# Patient Record
Sex: FEMALE | Race: OTHER | Employment: UNEMPLOYED | URBAN - NONMETROPOLITAN AREA
[De-identification: names, ages, dates, MRNs, and addresses within clinical notes are randomized per-mention and may not be internally consistent; named-entity substitution may affect disease eponyms.]

---
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
## 2024-01-02 LAB
C TRACH DNA SPEC QL PROBE+SIG AMP: NEGATIVE
N GONORRHOEA DNA SPEC QL PROBE+SIG AMP: NEGATIVE
SPECIMEN DESCRIPTION: NORMAL

## 2024-01-11 ENCOUNTER — TELEPHONE (OUTPATIENT)
Dept: OBGYN CLINIC | Age: 19
End: 2024-01-11

## 2024-01-16 ENCOUNTER — INITIAL PRENATAL (OUTPATIENT)
Dept: OBGYN CLINIC | Age: 19
End: 2024-01-16
Payer: COMMERCIAL

## 2024-01-16 VITALS — WEIGHT: 194 LBS | SYSTOLIC BLOOD PRESSURE: 118 MMHG | BODY MASS INDEX: 30.38 KG/M2 | DIASTOLIC BLOOD PRESSURE: 76 MMHG

## 2024-01-16 DIAGNOSIS — N91.2 AMENORRHEA: Primary | ICD-10-CM

## 2024-01-16 DIAGNOSIS — Z34.90 ENCOUNTER FOR SUPERVISION OF NORMAL PREGNANCY, ANTEPARTUM, UNSPECIFIED GRAVIDITY: ICD-10-CM

## 2024-01-16 PROCEDURE — H1000 PRENATAL CARE ATRISK ASSESSM: HCPCS

## 2024-01-16 RX ORDER — PNV NO.95/FERROUS FUM/FOLIC AC 28MG-0.8MG
1 TABLET ORAL DAILY
Qty: 90 TABLET | Refills: 3 | Status: CANCELLED | OUTPATIENT
Start: 2024-01-16

## 2024-01-16 NOTE — PATIENT INSTRUCTIONS
Patient Education        Nutrition for Breastfeeding: Care Instructions  Overview     Eating well during breastfeeding helps you stay healthy. Eat a variety of grains, vegetables, fruits, dairy or dairy alternatives, and protein foods. Avoid fish high in mercury. And limit alcohol and caffeine. Your doctor may suggest eating more calories each day than otherwise recommended for a person of your height and weight.  Follow-up care is a key part of your treatment and safety. Be sure to make and go to all appointments, and call your doctor if you are having problems. It's also a good idea to know your test results and keep a list of the medicines you take.  How can you care for yourself at home?  Making good choices about what you eat and drink when you are breastfeeding can help you stay healthy. It can also help your baby stay healthy. Here are some things you can do.  Eat a variety of healthy foods.  This includes vegetables, fruits, milk products, whole grains, and protein.  Drink plenty of fluids.  Make water your first choice. If you have kidney, heart, or liver disease and have to limit fluids, talk with your doctor before you increase your fluids.  Avoid fish with high mercury.  This includes shark, swordfish, lizzeth mackerel, and marlin. It also includes orange roughy, bigeye tuna, and tilefish from the [place]. Instead, eat fish that is low in mercury. Choose canned light tuna, salmon, pollock, catfish, or shrimp.   Limit caffeine.  Things like coffee, tea, chocolate, and some sodas can contain caffeine. Caffeine can pass through breast milk to your baby. It may cause fussiness and sleep problems. Talk to your doctor about how much caffeine is safe for you.  Limit alcohol.  Alcohol can pass through breast milk to your baby. Talk to your doctor if you have questions about drinking alcohol while breastfeeding.  Be safe with supplements.  Talk with your doctor before taking any vitamins, minerals, and herbal

## 2024-01-16 NOTE — PROGRESS NOTES
Avoid things that can make your body too hot.  For example, avoid hot tubs and saunas.     Soothe morning sickness.  Try eating 5 or 6 small meals a day, getting some fresh air, or using arun to control symptoms.     Ask your doctor about flu and COVID-19 shots.  Getting them can help protect against infection.   Follow-up care is a key part of your treatment and safety. Be sure to make and go to all appointments, and call your doctor if you are having problems. It's also a good idea to know your test results and keep a list of the medicines you take.  Where can you learn more?  Go to https://www.[employer].net/patientEd and enter G112 to learn more about \"Weeks 6 to 10 of Your Pregnancy: Care Instructions.\"  Current as of: July 11, 2023               Content Version: 13.9  © 0770-0804 [employer].   Care instructions adapted under license by "[employer]. If you have questions about a medical condition or this instruction, always ask your healthcare professional. APR disclaims any warranty or liability for your use of this information.         SURVEY:    You may be receiving a survey from [employer] regarding your visit today.    Please complete the survey to enable us to provide the highest quality of care to you and your family.    If you cannot score us a very good on any question, please call the office to discuss how we could have made your experience a very good one.    Thank you.  [facility]  MD Jossy Guerrero, MD Ced Mata, MD Kingsley Novak, DO  Richie Diaz, APRN-CNM  Evelyne Villareal, APRN-CNP  Tanesha Julian, PM  Jory, CMA  [place], CCMA  Angel, CMA  Adama, CMA  Bertin, [facility]   [place], MA

## 2024-01-26 ENCOUNTER — HOSPITAL ENCOUNTER (OUTPATIENT)
Age: 19
Setting detail: SPECIMEN
Discharge: HOME | End: 2024-01-26
Payer: COMMERCIAL

## 2024-01-26 ENCOUNTER — ROUTINE PRENATAL (OUTPATIENT)
Dept: OBGYN CLINIC | Age: 19
End: 2024-01-26
Payer: COMMERCIAL

## 2024-01-26 VITALS — DIASTOLIC BLOOD PRESSURE: 72 MMHG | SYSTOLIC BLOOD PRESSURE: 118 MMHG | WEIGHT: 194 LBS | BODY MASS INDEX: 30.38 KG/M2

## 2024-01-26 DIAGNOSIS — Z34.91 NORMAL PREGNANCY IN FIRST TRIMESTER: Primary | ICD-10-CM

## 2024-01-26 DIAGNOSIS — Z34.90 ENCOUNTER FOR SUPERVISION OF NORMAL PREGNANCY, ANTEPARTUM, UNSPECIFIED GRAVIDITY: ICD-10-CM

## 2024-01-26 DIAGNOSIS — Z3A.01 7 WEEKS GESTATION OF PREGNANCY: ICD-10-CM

## 2024-01-26 LAB
ABO + RH BLD: NORMAL
AMPHET UR QL SCN: NEGATIVE
BARBITURATES UR QL SCN: NEGATIVE
BENZODIAZ UR QL: NEGATIVE
BLOOD GROUP ANTIBODIES SERPL: NEGATIVE
CANNABINOIDS UR QL SCN: NEGATIVE
COCAINE UR QL SCN: NEGATIVE
FENTANYL UR QL: NEGATIVE
METHADONE UR QL: NEGATIVE
OPIATES UR QL SCN: NEGATIVE
OXYCODONE UR QL SCN: NEGATIVE
PCP UR QL SCN: NEGATIVE
TEST INFORMATION: NORMAL

## 2024-01-26 PROCEDURE — 86900 BLOOD TYPING SEROLOGIC ABO: CPT

## 2024-01-26 PROCEDURE — 87591 N.GONORRHOEAE DNA AMP PROB: CPT

## 2024-01-26 PROCEDURE — G8417 CALC BMI ABV UP PARAM F/U: HCPCS

## 2024-01-26 PROCEDURE — 87491 CHLMYD TRACH DNA AMP PROBE: CPT

## 2024-01-26 PROCEDURE — 99213 OFFICE O/P EST LOW 20 MIN: CPT

## 2024-01-26 PROCEDURE — 87340 HEPATITIS B SURFACE AG IA: CPT

## 2024-01-26 PROCEDURE — 80306 DRUG TEST PRSMV INSTRMNT: CPT

## 2024-01-26 PROCEDURE — 86850 RBC ANTIBODY SCREEN: CPT

## 2024-01-26 PROCEDURE — 86780 TREPONEMA PALLIDUM: CPT

## 2024-01-26 PROCEDURE — G8484 FLU IMMUNIZE NO ADMIN: HCPCS

## 2024-01-26 PROCEDURE — G8427 DOCREV CUR MEDS BY ELIG CLIN: HCPCS

## 2024-01-26 PROCEDURE — 85025 COMPLETE CBC W/AUTO DIFF WBC: CPT

## 2024-01-26 PROCEDURE — 87086 URINE CULTURE/COLONY COUNT: CPT

## 2024-01-26 PROCEDURE — 1036F TOBACCO NON-USER: CPT

## 2024-01-26 PROCEDURE — 86803 HEPATITIS C AB TEST: CPT

## 2024-01-26 PROCEDURE — 86901 BLOOD TYPING SEROLOGIC RH(D): CPT

## 2024-01-26 PROCEDURE — 87077 CULTURE AEROBIC IDENTIFY: CPT

## 2024-01-26 PROCEDURE — 87389 HIV-1 AG W/HIV-1&-2 AB AG IA: CPT

## 2024-01-26 PROCEDURE — 86762 RUBELLA ANTIBODY: CPT

## 2024-01-26 PROCEDURE — 87186 SC STD MICRODIL/AGAR DIL: CPT

## 2024-01-26 RX ORDER — PNV NO.95/FERROUS FUM/FOLIC AC 28MG-0.8MG
1 TABLET ORAL DAILY
Qty: 90 TABLET | Refills: 3 | Status: SHIPPED | OUTPATIENT
Start: 2024-01-26

## 2024-01-27 LAB
BASOPHILS # BLD: 0.03 K/UL (ref 0–0.2)
BASOPHILS NFR BLD: 0 % (ref 0–2)
EOSINOPHIL # BLD: 0.1 K/UL (ref 0–0.4)
EOSINOPHILS RELATIVE PERCENT: 1 % (ref 0–5)
ERYTHROCYTE [DISTWIDTH] IN BLOOD BY AUTOMATED COUNT: 13.6 % (ref 12.1–15.2)
HBV SURFACE AG SERPL QL IA: NONREACTIVE
HCT VFR BLD AUTO: 39.7 % (ref 36–46)
HCV AB SERPL QL IA: NONREACTIVE
HGB BLD-MCNC: 13 G/DL (ref 12–16)
HIV 1+2 AB+HIV1 P24 AG SERPL QL IA: NONREACTIVE
IMM GRANULOCYTES # BLD AUTO: 0.02 K/UL (ref 0–0.3)
IMM GRANULOCYTES NFR BLD: 0 % (ref 0–5)
LYMPHOCYTES NFR BLD: 2.05 K/UL (ref 1.2–5.2)
LYMPHOCYTES RELATIVE PERCENT: 27 % (ref 15–40)
MCH RBC QN AUTO: 27.2 PG (ref 26–34)
MCHC RBC AUTO-ENTMCNC: 32.7 G/DL (ref 31–37)
MCV RBC AUTO: 83.1 FL (ref 80–100)
MONOCYTES NFR BLD: 0.46 K/UL (ref 0–1)
MONOCYTES NFR BLD: 6 % (ref 4–8)
NEUTROPHILS NFR BLD: 66 % (ref 47–75)
NEUTS SEG NFR BLD: 5.08 K/UL (ref 2.5–7)
PLATELET # BLD AUTO: 320 K/UL (ref 140–450)
PMV BLD AUTO: 10.2 FL (ref 6–12)
RBC # BLD AUTO: 4.78 M/UL (ref 4–5.2)
RUBV IGG SERPL QL IA: 90.53 IU/ML
T PALLIDUM AB SER QL IA: NONREACTIVE
WBC OTHER # BLD: 7.7 K/UL (ref 4.5–13.5)

## 2024-01-28 LAB
MICROORGANISM SPEC CULT: ABNORMAL
SPECIMEN DESCRIPTION: ABNORMAL

## 2024-01-29 LAB
CHLAMYDIA DNA UR QL NAA+PROBE: NEGATIVE
N GONORRHOEA DNA UR QL NAA+PROBE: NEGATIVE
SPECIMEN DESCRIPTION: NORMAL

## 2024-01-29 RX ORDER — NITROFURANTOIN 25; 75 MG/1; MG/1
100 CAPSULE ORAL 2 TIMES DAILY
Qty: 10 CAPSULE | Refills: 0 | Status: SHIPPED | OUTPATIENT
Start: 2024-01-29 | End: 2024-02-03

## 2024-02-16 ENCOUNTER — ROUTINE PRENATAL (OUTPATIENT)
Dept: OBGYN CLINIC | Age: 19
End: 2024-02-16

## 2024-02-16 VITALS
SYSTOLIC BLOOD PRESSURE: 118 MMHG | DIASTOLIC BLOOD PRESSURE: 76 MMHG | HEART RATE: 68 BPM | WEIGHT: 199 LBS | BODY MASS INDEX: 31.17 KG/M2

## 2024-02-16 DIAGNOSIS — Z34.91 NORMAL PREGNANCY IN FIRST TRIMESTER: Primary | ICD-10-CM

## 2024-02-16 DIAGNOSIS — Z3A.10 10 WEEKS GESTATION OF PREGNANCY: ICD-10-CM

## 2024-02-16 DIAGNOSIS — O09.291 HISTORY OF PREGNANCY LOSS IN PRIOR PREGNANCY, CURRENTLY PREGNANT IN FIRST TRIMESTER: ICD-10-CM

## 2024-02-16 NOTE — PROGRESS NOTES
Bibiana Dangelo is here at 10w5d for:    Chief Complaint   Patient presents with    Routine Prenatal Visit     Patient presents today for routine ob care with in house pan/horizon draw.        Estimated Due Date: Estimated Date of Delivery: 24    OB History    Para Term  AB Living   2 1 0 0 0 0   SAB IAB Ectopic Molar Multiple Live Births   0 0 0 0 0 0      # Outcome Date GA Lbr Holger/2nd Weight Sex Delivery Anes PTL Lv   2 Current            1 Para 23 19w5d  0.12 kg (4.2 oz)  Vag-Spont None N FD      Complications: Fetal demise before 22 weeks with retention of dead fetus        Past Medical History:   Diagnosis Date    Acute depression 2020    ADHD (attention deficit hyperactivity disorder)     Headache      delivery     Strep throat     Trauma        History reviewed. No pertinent surgical history.    Social History     Tobacco Use   Smoking Status Never   Smokeless Tobacco Never        Social History     Substance and Sexual Activity   Alcohol Use Never       No results found for this visit on 24.        HPI: Patient presents for a routine OB visit and declines concerns. She reports fetal movement is absent. She declines contractions, vaginal bleeding, and leaking of fluids.        PT denies fever, chills, nausea and vomiting       Vitals:  Estimated body mass index is 31.17 kg/m² as calculated from the following:    Height as of 23: 1.702 m (5' 7\").    Weight as of this encounter: 90.3 kg (199 lb).  BP: 118/76  Weight - Scale: 90.3 kg (199 lb)  Pulse: 68  Patient Position: Sitting  Fetal HR: 162  Movement: Absent           Abdomen: soft, nontender    Results reviewed today:    No results found for this visit on 24.        ASSESSMENT & Plan    Diagnosis Orders   1. Normal pregnancy in first trimester  Panorama Prenatal Test    Horizon 3 (SMA, CF, Fragile X)      2. 10 weeks gestation of pregnancy  Panorama Prenatal Test    Horizon 3 (SMA, CF, Fragile X)

## 2024-02-22 LAB
Lab: NORMAL
NTRA FETAL FRACTION: NORMAL
NTRA GENDER OF FETUS: NORMAL
NTRA MONOSOMY X AGE-BASED RISK TEXT: NORMAL
NTRA MONOSOMY X RESULT TEXT: NORMAL
NTRA MONOSOMY X RISK SCORE TEXT: NORMAL
NTRA TRIPLOIDY RESULT TEXT: NORMAL
NTRA TRISOMY 13 AGE-BASED RISK TEXT: NORMAL
NTRA TRISOMY 13 RESULT TEXT: NORMAL
NTRA TRISOMY 13 RISK SCORE TEXT: NORMAL
NTRA TRISOMY 18 AGE-BASED RISK TEXT: NORMAL
NTRA TRISOMY 18 RESULT TEXT: NORMAL
NTRA TRISOMY 18 RISK SCORE TEXT: NORMAL
NTRA TRISOMY 21 AGE-BASED RISK TEXT: NORMAL
NTRA TRISOMY 21 RESULT TEXT: NORMAL
NTRA TRISOMY 21 RISK SCORE TEXT: NORMAL

## 2024-02-25 LAB
Lab: NEGATIVE
Lab: NORMAL
NTRA CYSTIC FIBROSIS: NEGATIVE
NTRA FRAGILE X SYNDROME: NEGATIVE
NTRA SPINAL MUSCULAR ATROPHY: NEGATIVE

## 2024-03-08 ENCOUNTER — HOSPITAL ENCOUNTER (OUTPATIENT)
Age: 19
Setting detail: SPECIMEN
Discharge: HOME | End: 2024-03-08
Payer: COMMERCIAL

## 2024-03-08 ENCOUNTER — ROUTINE PRENATAL (OUTPATIENT)
Dept: OBGYN CLINIC | Age: 19
End: 2024-03-08

## 2024-03-08 VITALS
SYSTOLIC BLOOD PRESSURE: 116 MMHG | DIASTOLIC BLOOD PRESSURE: 72 MMHG | WEIGHT: 190 LBS | BODY MASS INDEX: 29.76 KG/M2 | HEART RATE: 101 BPM

## 2024-03-08 DIAGNOSIS — N89.8 VAGINAL DISCHARGE: ICD-10-CM

## 2024-03-08 DIAGNOSIS — R30.0 BURNING WITH URINATION: ICD-10-CM

## 2024-03-08 DIAGNOSIS — Z34.92 NORMAL PREGNANCY IN SECOND TRIMESTER: Primary | ICD-10-CM

## 2024-03-08 DIAGNOSIS — Z3A.13 13 WEEKS GESTATION OF PREGNANCY: ICD-10-CM

## 2024-03-08 DIAGNOSIS — O09.292 HISTORY OF PREGNANCY LOSS IN PRIOR PREGNANCY, CURRENTLY PREGNANT IN SECOND TRIMESTER: ICD-10-CM

## 2024-03-08 LAB
CANDIDA SPECIES: NEGATIVE
SOURCE: NORMAL
TRICHOMONAS: NEGATIVE

## 2024-03-08 PROCEDURE — 87086 URINE CULTURE/COLONY COUNT: CPT

## 2024-03-08 PROCEDURE — 87510 GARDNER VAG DNA DIR PROBE: CPT

## 2024-03-08 PROCEDURE — 87480 CANDIDA DNA DIR PROBE: CPT

## 2024-03-08 PROCEDURE — 87660 TRICHOMONAS VAGIN DIR PROBE: CPT

## 2024-03-08 ASSESSMENT — PATIENT HEALTH QUESTIONNAIRE - PHQ9
6. FEELING BAD ABOUT YOURSELF - OR THAT YOU ARE A FAILURE OR HAVE LET YOURSELF OR YOUR FAMILY DOWN: 0
10. IF YOU CHECKED OFF ANY PROBLEMS, HOW DIFFICULT HAVE THESE PROBLEMS MADE IT FOR YOU TO DO YOUR WORK, TAKE CARE OF THINGS AT HOME, OR GET ALONG WITH OTHER PEOPLE: 0
1. LITTLE INTEREST OR PLEASURE IN DOING THINGS: 0
8. MOVING OR SPEAKING SO SLOWLY THAT OTHER PEOPLE COULD HAVE NOTICED. OR THE OPPOSITE, BEING SO FIGETY OR RESTLESS THAT YOU HAVE BEEN MOVING AROUND A LOT MORE THAN USUAL: 0
SUM OF ALL RESPONSES TO PHQ QUESTIONS 1-9: 2
4. FEELING TIRED OR HAVING LITTLE ENERGY: 2
9. THOUGHTS THAT YOU WOULD BE BETTER OFF DEAD, OR OF HURTING YOURSELF: 0
7. TROUBLE CONCENTRATING ON THINGS, SUCH AS READING THE NEWSPAPER OR WATCHING TELEVISION: 0
5. POOR APPETITE OR OVEREATING: 0
3. TROUBLE FALLING OR STAYING ASLEEP: 0
SUM OF ALL RESPONSES TO PHQ9 QUESTIONS 1 & 2: 0
2. FEELING DOWN, DEPRESSED OR HOPELESS: 0

## 2024-03-08 NOTE — PROGRESS NOTES
Bibiana Dangelo is here at 13w5d for:    Chief Complaint   Patient presents with    Routine Prenatal Visit     Patient presents today for routine ob care. Unable to leave urine sample but having burning with urination.        Estimated Due Date: Estimated Date of Delivery: 24    OB History    Para Term  AB Living   2 1 0 0 0 0   SAB IAB Ectopic Molar Multiple Live Births   0 0 0 0 0 0      # Outcome Date GA Lbr Holger/2nd Weight Sex Delivery Anes PTL Lv   2 Current            1 Para 23 19w5d  0.12 kg (4.2 oz)  Vag-Spont None N FD      Complications: Fetal demise before 22 weeks with retention of dead fetus        Past Medical History:   Diagnosis Date    Acute depression 2020    ADHD (attention deficit hyperactivity disorder)     Headache      delivery     Strep throat     Trauma        History reviewed. No pertinent surgical history.    Social History     Tobacco Use   Smoking Status Never   Smokeless Tobacco Never        Social History     Substance and Sexual Activity   Alcohol Use Never       No results found for this visit on 24.        HPI: Patient presents for a routine OB visit and reports concerns for thick, white vaginal discharge with an odor and burning with urination. She reports fetal movement is absent. She declines contractions, vaginal bleeding, and leaking of fluids. She also reports some back pain and thinks this may be due to work because she walks a lot.      PT denies fever, chills, nausea and vomiting       Vitals:  Estimated body mass index is 29.76 kg/m² as calculated from the following:    Height as of 23: 1.702 m (5' 7\").    Weight as of this encounter: 86.2 kg (190 lb).  BP: 116/72  Weight - Scale: 86.2 kg (190 lb)  Pulse: (!) 101  Patient Position: Sitting  Fetal HR: 154  Movement: Absent           Abdomen: soft, nontender    Results reviewed today:    No results found for this visit on 24.        ASSESSMENT & Plan    Diagnosis

## 2024-03-09 LAB
MICROORGANISM SPEC CULT: NORMAL
SPECIMEN DESCRIPTION: NORMAL

## 2024-03-21 ENCOUNTER — HOSPITAL ENCOUNTER (EMERGENCY)
Age: 19
Discharge: HOME | End: 2024-03-21
Attending: EMERGENCY MEDICINE
Payer: COMMERCIAL

## 2024-03-21 VITALS
HEART RATE: 89 BPM | SYSTOLIC BLOOD PRESSURE: 125 MMHG | TEMPERATURE: 98.6 F | WEIGHT: 190 LBS | BODY MASS INDEX: 28.79 KG/M2 | DIASTOLIC BLOOD PRESSURE: 76 MMHG | HEIGHT: 68 IN | OXYGEN SATURATION: 98 % | RESPIRATION RATE: 18 BRPM

## 2024-03-21 DIAGNOSIS — O26.892 ABDOMINAL PAIN DURING PREGNANCY IN SECOND TRIMESTER: Primary | ICD-10-CM

## 2024-03-21 DIAGNOSIS — R10.9 ABDOMINAL PAIN DURING PREGNANCY IN SECOND TRIMESTER: Primary | ICD-10-CM

## 2024-03-21 LAB
BILIRUB UR QL STRIP: NEGATIVE
CLARITY UR: CLEAR
COLOR UR: YELLOW
COMMENT: ABNORMAL
GLUCOSE UR STRIP-MCNC: NEGATIVE MG/DL
HGB UR QL STRIP.AUTO: NEGATIVE
KETONES UR STRIP-MCNC: NEGATIVE MG/DL
LEUKOCYTE ESTERASE UR QL STRIP: NEGATIVE
NITRITE UR QL STRIP: NEGATIVE
PH UR STRIP: 5 [PH] (ref 5–8)
PROT UR STRIP-MCNC: ABNORMAL MG/DL
SP GR UR STRIP: 1.03 (ref 1–1.03)
UROBILINOGEN UR STRIP-ACNC: NORMAL EU/DL (ref 0–1)

## 2024-03-21 PROCEDURE — 81003 URINALYSIS AUTO W/O SCOPE: CPT

## 2024-03-21 PROCEDURE — 99283 EMERGENCY DEPT VISIT LOW MDM: CPT

## 2024-03-21 ASSESSMENT — PAIN - FUNCTIONAL ASSESSMENT
PAIN_FUNCTIONAL_ASSESSMENT: ACTIVITIES ARE NOT PREVENTED
PAIN_FUNCTIONAL_ASSESSMENT: 0-10

## 2024-03-21 ASSESSMENT — PAIN DESCRIPTION - LOCATION: LOCATION: ABDOMEN

## 2024-03-21 ASSESSMENT — PAIN SCALES - GENERAL: PAINLEVEL_OUTOF10: 7

## 2024-03-21 NOTE — ED PROVIDER NOTES
EMERGENCY DEPARTMENT ENCOUNTER      CHIEF COMPLAINT    Chief Complaint   Patient presents with    Abdominal Pain     C/o lower abdominal pain that started yesterday and is 15 weeks pregnant. Denies any vaginal bleeding.       SOFIE Dangelo is a 18 y.o. female who is approximately 15 weeks pregnant who presents to the emergency room for evaluation of lower abdominal pain.  She denies any vaginal bleeding or vaginal discharge or vomiting or fevers or chills or urinary symptoms.  She does report nausea.  History is through  as patient is French-speaking.      PAST MEDICAL HISTORY    Past Medical History:   Diagnosis Date    Acute depression 2020    ADHD (attention deficit hyperactivity disorder)     Headache      delivery     Strep throat     Trauma        SURGICAL HISTORY    History reviewed. No pertinent surgical history.    CURRENT MEDICATIONS    Current Outpatient Rx   Medication Sig Dispense Refill    Prenatal Vit-Fe Fumarate-FA (PRENATAL VITAMINS) 28-0.8 MG TABS Take 1 tablet by mouth daily 90 tablet 3       ALLERGIES    No Known Allergies    FAMILY HISTORY    Family History   Problem Relation Age of Onset    Mental Illness Maternal Grandmother     Arthritis Maternal Grandmother     Diabetes Maternal Grandmother     Prostate Cancer Maternal Grandfather     Breast Cancer Neg Hx     Ovarian Cancer Neg Hx     Stroke Neg Hx        SOCIAL HISTORY    Social History     Socioeconomic History    Marital status: Single     Spouse name: None    Number of children: None    Years of education: None    Highest education level: None   Tobacco Use    Smoking status: Never    Smokeless tobacco: Never   Vaping Use    Vaping Use: Never used   Substance and Sexual Activity    Alcohol use: Never    Drug use: Never    Sexual activity: Yes     Partners: Male     Comment: none     Social Determinants of Health     Financial Resource Strain: Low Risk  (2023)    Overall Financial Resource

## 2024-03-21 NOTE — DISCHARGE INSTRUCTIONS
Tylenol según sea necesario para el dolor. Regrese a la agnes de emergencias si los síntomas empeoran, incluido el empeoramiento del dolor abdominal, o si desarrolla vómitos intratables, fiebre o escalofríos. Llame a palmer obstetra para seguimiento.

## 2024-03-28 ENCOUNTER — TELEPHONE (OUTPATIENT)
Dept: OBGYN | Age: 19
End: 2024-03-28

## 2024-03-29 ENCOUNTER — ROUTINE PRENATAL (OUTPATIENT)
Dept: OBGYN CLINIC | Age: 19
End: 2024-03-29
Payer: COMMERCIAL

## 2024-03-29 ENCOUNTER — HOSPITAL ENCOUNTER (OUTPATIENT)
Age: 19
Setting detail: SPECIMEN
Discharge: HOME | End: 2024-03-29
Payer: COMMERCIAL

## 2024-03-29 VITALS
DIASTOLIC BLOOD PRESSURE: 72 MMHG | HEART RATE: 77 BPM | SYSTOLIC BLOOD PRESSURE: 118 MMHG | BODY MASS INDEX: 29.1 KG/M2 | WEIGHT: 192 LBS

## 2024-03-29 DIAGNOSIS — Z3A.16 16 WEEKS GESTATION OF PREGNANCY: ICD-10-CM

## 2024-03-29 DIAGNOSIS — Z34.92 NORMAL PREGNANCY IN SECOND TRIMESTER: Primary | ICD-10-CM

## 2024-03-29 DIAGNOSIS — O09.292 HISTORY OF PREGNANCY LOSS IN PRIOR PREGNANCY, CURRENTLY PREGNANT IN SECOND TRIMESTER: ICD-10-CM

## 2024-03-29 PROCEDURE — 36415 COLL VENOUS BLD VENIPUNCTURE: CPT

## 2024-03-29 PROCEDURE — G8427 DOCREV CUR MEDS BY ELIG CLIN: HCPCS

## 2024-03-29 PROCEDURE — 82105 ALPHA-FETOPROTEIN SERUM: CPT

## 2024-03-29 PROCEDURE — 99213 OFFICE O/P EST LOW 20 MIN: CPT

## 2024-03-29 PROCEDURE — G8484 FLU IMMUNIZE NO ADMIN: HCPCS

## 2024-03-29 PROCEDURE — G8417 CALC BMI ABV UP PARAM F/U: HCPCS

## 2024-03-29 PROCEDURE — 1036F TOBACCO NON-USER: CPT

## 2024-03-29 NOTE — PROGRESS NOTES
Bibiana Dangelo is here at 16w5d for:    Chief Complaint   Patient presents with    Routine Prenatal Visit     Patient presents today for routine ob care. Unable to leave urine sample at this time. No concerns.        Estimated Due Date: Estimated Date of Delivery: 24    OB History    Para Term  AB Living   2 1 0 0 0 0   SAB IAB Ectopic Molar Multiple Live Births   0 0 0 0 0 0      # Outcome Date GA Lbr Holger/2nd Weight Sex Delivery Anes PTL Lv   2 Current            1 Para 23 19w5d  0.12 kg (4.2 oz)  Vag-Spont None N FD      Complications: Fetal demise before 22 weeks with retention of dead fetus        Past Medical History:   Diagnosis Date    Acute depression 2020    ADHD (attention deficit hyperactivity disorder)     Headache      delivery     Strep throat     Trauma        History reviewed. No pertinent surgical history.    Social History     Tobacco Use   Smoking Status Never   Smokeless Tobacco Never        Social History     Substance and Sexual Activity   Alcohol Use Never       No results found for this visit on 24.        HPI: Patient presents for a routine OB visit and declines concerns. She reports fetal movement is absent. She declines contractions, vaginal bleeding, and leaking of fluids.        PT denies fever, chills, nausea and vomiting       Vitals:  Estimated body mass index is 29.1 kg/m² as calculated from the following:    Height as of 3/21/24: 1.73 m (5' 8.11\").    Weight as of this encounter: 87.1 kg (192 lb).  BP: 118/72  Weight - Scale: 87.1 kg (192 lb)  Pulse: 77  Patient Position: Sitting  Fetal HR: 150  Movement: Absent  Presentation: Unknown           Abdomen: soft, nontender    Results reviewed today:    No results found for this visit on 24.        ASSESSMENT & Plan    Diagnosis Orders   1. Normal pregnancy in second trimester        2. 16 weeks gestation of pregnancy  Alpha Fetoprotein, Maternal      3. History of pregnancy loss

## 2024-04-03 ENCOUNTER — HOSPITAL ENCOUNTER (OUTPATIENT)
Age: 19
Discharge: HOME | End: 2024-04-03
Payer: COMMERCIAL

## 2024-04-03 ENCOUNTER — ROUTINE PRENATAL (OUTPATIENT)
Dept: PERINATAL CARE | Age: 19
End: 2024-04-03
Payer: COMMERCIAL

## 2024-04-03 VITALS
HEIGHT: 68 IN | TEMPERATURE: 99.9 F | HEART RATE: 79 BPM | DIASTOLIC BLOOD PRESSURE: 71 MMHG | BODY MASS INDEX: 29.4 KG/M2 | RESPIRATION RATE: 16 BRPM | SYSTOLIC BLOOD PRESSURE: 107 MMHG | WEIGHT: 194 LBS

## 2024-04-03 DIAGNOSIS — O09.299 PREVIOUS STILLBIRTH OR DEMISE, ANTEPARTUM: Primary | ICD-10-CM

## 2024-04-03 DIAGNOSIS — Z3A.17 17 WEEKS GESTATION OF PREGNANCY: ICD-10-CM

## 2024-04-03 LAB — HCYS SERPL-SCNC: 5.7 UMOL/L (ref 0–15)

## 2024-04-03 PROCEDURE — 85303 CLOT INHIBIT PROT C ACTIVITY: CPT

## 2024-04-03 PROCEDURE — 81291 MTHFR GENE: CPT

## 2024-04-03 PROCEDURE — 85610 PROTHROMBIN TIME: CPT

## 2024-04-03 PROCEDURE — 85305 CLOT INHIBIT PROT S TOTAL: CPT

## 2024-04-03 PROCEDURE — 85302 CLOT INHIBIT PROT C ANTIGEN: CPT

## 2024-04-03 PROCEDURE — 86147 CARDIOLIPIN ANTIBODY EA IG: CPT

## 2024-04-03 PROCEDURE — G8417 CALC BMI ABV UP PARAM F/U: HCPCS | Performed by: OBSTETRICS & GYNECOLOGY

## 2024-04-03 PROCEDURE — 85613 RUSSELL VIPER VENOM DILUTED: CPT

## 2024-04-03 PROCEDURE — 81240 F2 GENE: CPT

## 2024-04-03 PROCEDURE — 36415 COLL VENOUS BLD VENIPUNCTURE: CPT

## 2024-04-03 PROCEDURE — 83090 ASSAY OF HOMOCYSTEINE: CPT

## 2024-04-03 PROCEDURE — 82105 ALPHA-FETOPROTEIN SERUM: CPT

## 2024-04-03 PROCEDURE — 99244 OFF/OP CNSLTJ NEW/EST MOD 40: CPT | Performed by: OBSTETRICS & GYNECOLOGY

## 2024-04-03 PROCEDURE — 81241 F5 GENE: CPT

## 2024-04-03 PROCEDURE — 85300 ANTITHROMBIN III ACTIVITY: CPT

## 2024-04-03 PROCEDURE — 85301 ANTITHROMBIN III ANTIGEN: CPT

## 2024-04-03 PROCEDURE — 85306 CLOT INHIBIT PROT S FREE: CPT

## 2024-04-03 PROCEDURE — 85730 THROMBOPLASTIN TIME PARTIAL: CPT

## 2024-04-03 PROCEDURE — G8427 DOCREV CUR MEDS BY ELIG CLIN: HCPCS | Performed by: OBSTETRICS & GYNECOLOGY

## 2024-04-05 LAB
PROT C AG ACT/NOR PPP IA: 85 % (ref 63–153)
PROT S AG ACT/NOR PPP IA: 78 % (ref 63–126)

## 2024-04-06 LAB
AFP INTERPRETATION: NORMAL
AFP INTERPRETATION: NORMAL
AFP MOM: 0.86
AFP MOM: 0.94
AFP SPECIMEN: NORMAL
AFP SPECIMEN: NORMAL
AFP: 30 NG/ML
AFP: 30 NG/ML
AT III AG ACT/NOR PPP IA: 70 % (ref 82–136)
CARDIOLIPIN IGA SER IA-ACNC: 0.8 APL (ref 0–14)
CARDIOLIPIN IGG SER IA-ACNC: 0.6 GPL (ref 0–10)
CARDIOLIPIN IGM SER IA-ACNC: 1 MPL (ref 0–10)
DATE OF BIRTH: NORMAL
DATE OF BIRTH: NORMAL
DATING METHOD: NORMAL
DATING METHOD: NORMAL
DETERMINED BY: NORMAL
DETERMINED BY: NORMAL
DIABETIC: NO
DIABETIC: NORMAL
DILUTE RUSSELL VIPER VENOM TIME: NORMAL
DONOR EGG?: NO
DONOR EGG?: NORMAL
DUE DATE: NORMAL
DUE DATE: NORMAL
ESTIMATED DUE DATE: NORMAL
ESTIMATED DUE DATE: NORMAL
F2 C.20210G>A GENO BLD/T: NEGATIVE
F5 P.R506Q BLD/T QL: NEGATIVE
FAMILY HISTORY NTD: NO
FAMILY HISTORY NTD: NORMAL
GESTATIONAL AGE: NORMAL
GESTATIONAL AGE: NORMAL
IN VITRO FERTILIZATION: NO
IN VITRO FERTILIZATION: NORMAL
INR PPP: 1
INSULIN REQ DIABETES: NO
INSULIN REQ DIABETES: NO
LAST MENSTRUAL PERIOD: NORMAL
LAST MENSTRUAL PERIOD: NORMAL
LUPUS ANTICOAG: NORMAL
MATERNAL AGE AT EDD: 19.8 YR
MATERNAL AGE AT EDD: 19.8 YR
MATERNAL WEIGHT: 194
MATERNAL WEIGHT: NORMAL
MONOCHORIONIC TWINS: NORMAL
MONOCHORIONIC TWINS: NORMAL
MTHFR INTERPRETATION: NORMAL
MTHFR MUTATION A1286C: NORMAL
MTHFR MUTATION C665T: NEGATIVE
NUMBER OF FETUSES: NORMAL
NUMBER OF FETUSES: NORMAL
PARTIAL THROMBOPLASTIN TIME: 25.9 SEC (ref 23–36.5)
PATIENT WEIGHT UNITS: NORMAL
PATIENT WEIGHT UNITS: NORMAL
PATIENT WEIGHT: NORMAL
PATIENT WEIGHT: NORMAL
PROTHROMBIN TIME: 13.5 SEC (ref 11.7–14.9)
RACE (MATERNAL): NORMAL
RACE (MATERNAL): NORMAL
RACE: NORMAL
RACE: NORMAL
REPEAT SPECIMEN?: NO
REPEAT SPECIMEN?: NORMAL
SMOKING: NO
SMOKING: NO
SMOKING: NORMAL
SMOKING: NORMAL
SPECIMEN SOURCE: NORMAL
SPECIMEN SOURCE: NORMAL
SPECIMEN: NORMAL
VALPROIC/CARBAMAZEP: NORMAL
VALPROIC/CARBAMAZEP: NORMAL
ZZ NTE CLEAN UP: HISTORY: NO
ZZ NTE CLEAN UP: HISTORY: NORMAL

## 2024-04-08 LAB
AT III ACT/NOR PPP CHRO: 82 % (ref 83–122)
CARDIOLIPIN IGA SER IA-ACNC: 0.8 APL (ref 0–14)
CARDIOLIPIN IGG SER IA-ACNC: 0.6 GPL (ref 0–10)
CARDIOLIPIN IGM SER IA-ACNC: 1 MPL (ref 0–10)
DILUTE RUSSELL VIPER VENOM TIME: NORMAL
INR PPP: 1
PARTIAL THROMBOPLASTIN TIME: 25.9 SEC (ref 23–36.5)
PROTEIN C ACTIVITY: 99 %
PROTEIN S ACTIVITY: 64 % (ref 59–130)
PROTHROMBIN TIME: 13.5 SEC (ref 11.7–14.9)

## 2024-04-15 ENCOUNTER — TELEPHONE (OUTPATIENT)
Dept: PERINATAL CARE | Age: 19
End: 2024-04-15

## 2024-04-15 NOTE — TELEPHONE ENCOUNTER
Dr. Alva reviewed blood sork and ordered pt to begin Foltx, 1 po daily.  Phone call to pt and talked to , Luis Alfredo Froylan, states pt not available and has permission to talk to .  Explained the need to start Foltx daily, thru the pregnancy.   verb. understanding   Writer called into [facility] pharmacy in [place], [place] , [phone], Tay, Foltx, 1 po daily, 20 week supply, no refills.

## 2024-04-23 ENCOUNTER — ROUTINE PRENATAL (OUTPATIENT)
Dept: PERINATAL CARE | Age: 19
End: 2024-04-23
Payer: COMMERCIAL

## 2024-04-23 VITALS
HEIGHT: 68 IN | SYSTOLIC BLOOD PRESSURE: 100 MMHG | DIASTOLIC BLOOD PRESSURE: 70 MMHG | BODY MASS INDEX: 30.31 KG/M2 | HEART RATE: 80 BPM | WEIGHT: 200 LBS | TEMPERATURE: 99.1 F | RESPIRATION RATE: 16 BRPM

## 2024-04-23 DIAGNOSIS — O99.212 OBESITY AFFECTING PREGNANCY IN SECOND TRIMESTER, UNSPECIFIED OBESITY TYPE: ICD-10-CM

## 2024-04-23 DIAGNOSIS — E72.12 METHYLENETETRAHYDROFOLATE REDUCTASE DEFICIENCY AFFECTING PREGNANCY IN SECOND TRIMESTER (HCC): ICD-10-CM

## 2024-04-23 DIAGNOSIS — Z36.86 ENCOUNTER FOR SCREENING FOR RISK OF PRE-TERM LABOR: ICD-10-CM

## 2024-04-23 DIAGNOSIS — O09.899 SHORT INTERVAL BETWEEN PREGNANCIES COMPLICATING PREGNANCY, ANTEPARTUM: ICD-10-CM

## 2024-04-23 DIAGNOSIS — O09.299 PREVIOUS STILLBIRTH OR DEMISE, ANTEPARTUM: ICD-10-CM

## 2024-04-23 DIAGNOSIS — Z3A.20 20 WEEKS GESTATION OF PREGNANCY: ICD-10-CM

## 2024-04-23 DIAGNOSIS — O35.BXX0 FETAL CARDIAC ECHOGENIC FOCUS, ANTEPARTUM, SINGLE OR UNSPECIFIED FETUS: Primary | ICD-10-CM

## 2024-04-23 DIAGNOSIS — O99.282 METHYLENETETRAHYDROFOLATE REDUCTASE DEFICIENCY AFFECTING PREGNANCY IN SECOND TRIMESTER (HCC): ICD-10-CM

## 2024-04-23 PROCEDURE — 99999 PR OFFICE/OUTPT VISIT,PROCEDURE ONLY: CPT | Performed by: OBSTETRICS & GYNECOLOGY

## 2024-04-23 PROCEDURE — 76817 TRANSVAGINAL US OBSTETRIC: CPT | Performed by: OBSTETRICS & GYNECOLOGY

## 2024-04-23 PROCEDURE — 76811 OB US DETAILED SNGL FETUS: CPT | Performed by: OBSTETRICS & GYNECOLOGY

## 2024-04-23 RX ORDER — CYANOCOBALAMIN/FOLIC AC/VIT B6 2-2.5-25MG
1 TABLET ORAL DAILY
COMMUNITY
Start: 2024-04-15

## 2024-04-23 NOTE — PROGRESS NOTES
Obstetric/Gynecology Maternal Fetal Medicine Resident Note     ID: #[id number]    Patient updated on ultrasound findings of multiple suspected echogenic cardiac foci.     Patient declines formal consult with M attending physician for multiple suspected echogenic cardiac foci.    DO MERARI Chaney Resident, PGY1  [facility]  4/23/2024, 11:57 AM

## 2024-04-23 NOTE — PROGRESS NOTES
Please refer to non-invasive prenatal testing/NIPT results (via [facility]).     Please refer to completed maternal carrier testing ([facility] Horizon Carrier Screen).      Please refer to the completed laboratory evaluation for acquired/inherited thrombophilias.   Continue daily oral foltx as prescribed.     Options with respect to offering the patient invasive genetic prenatal testing could not be completed today, as the patient declines a Maternal-Fetal Medicine physician consultation today.     Patient declines a Maternal-Fetal Medicine follow-up physician consultation today regarding the fetal and/or maternal medical/obstetrical complications/co-morbidities of pregnancy (specifically for new ultrasound finding of echogenic foci).      Maternal-Fetal Medicine (MFM) attending physician will defer all management for these medical/obstetrical complications of pregnancy to the primary attending obstetrical physician/provider, as a result.  Therefore, only an ultrasound evaluation was completed today in the MFM office.      Please refer to Maternal-Fetal Medicine OBGYN resident progress note in EPIC.

## 2024-05-10 ENCOUNTER — ROUTINE PRENATAL (OUTPATIENT)
Dept: OBGYN CLINIC | Age: 19
End: 2024-05-10

## 2024-05-10 VITALS
DIASTOLIC BLOOD PRESSURE: 72 MMHG | HEART RATE: 70 BPM | SYSTOLIC BLOOD PRESSURE: 114 MMHG | BODY MASS INDEX: 30.56 KG/M2 | WEIGHT: 201 LBS

## 2024-05-10 DIAGNOSIS — Z3A.22 22 WEEKS GESTATION OF PREGNANCY: ICD-10-CM

## 2024-05-10 DIAGNOSIS — O35.BXX0 FETAL CARDIAC ECHOGENIC FOCUS, ANTEPARTUM, SINGLE OR UNSPECIFIED FETUS: ICD-10-CM

## 2024-05-10 DIAGNOSIS — Z34.92 NORMAL PREGNANCY IN SECOND TRIMESTER: Primary | ICD-10-CM

## 2024-05-10 NOTE — PROGRESS NOTES
Bibiana Dangelo is here at 22w5d for:    Chief Complaint   Patient presents with    Routine Prenatal Visit     Patient presents today for routine ob care. Review 20wk scan from [facility].        Estimated Due Date: Estimated Date of Delivery: 24    OB History    Para Term  AB Living   2 1 0 0 0 0   SAB IAB Ectopic Molar Multiple Live Births   0 0 0 0 0 0      # Outcome Date GA Lbr Holger/2nd Weight Sex Delivery Anes PTL Lv   2 Current            1 Para 23 19w5d  0.12 kg (4.2 oz)  Vag-Spont None N FD      Complications: Fetal demise before 22 weeks with retention of dead fetus        Past Medical History:   Diagnosis Date    Acute depression 2020    ADHD (attention deficit hyperactivity disorder)     Headache      delivery     Strep throat     Trauma        History reviewed. No pertinent surgical history.    Social History     Tobacco Use   Smoking Status Never   Smokeless Tobacco Never        Social History     Substance and Sexual Activity   Alcohol Use Never       No results found for this visit on 05/10/24.        HPI: Patient presents for a routine OB visit and declines concerns. She reports fetal movement is absent. She declines contractions, vaginal bleeding, and leaking of fluids.        PT denies fever, chills, nausea and vomiting       Vitals:  Estimated body mass index is 30.56 kg/m² as calculated from the following:    Height as of 24: 1.727 m (5' 8\").    Weight as of this encounter: 91.2 kg (201 lb).  BP: 114/72  Weight - Scale: 91.2 kg (201 lb)  Pulse: 70  Patient Position: Sitting  Fetal HR: 146  Movement: Absent  Presentation: Unknown           Abdomen: soft, nontender    Results reviewed today:    No results found for this visit on 05/10/24.        ASSESSMENT & Plan    Diagnosis Orders   1. Normal pregnancy in second trimester        2. 22 weeks gestation of pregnancy        3. Fetal cardiac echogenic focus, antepartum, single or unspecified fetus

## 2024-05-30 ENCOUNTER — ROUTINE PRENATAL (OUTPATIENT)
Dept: PERINATAL CARE | Age: 19
End: 2024-05-30
Payer: COMMERCIAL

## 2024-05-30 VITALS
SYSTOLIC BLOOD PRESSURE: 106 MMHG | HEIGHT: 68 IN | BODY MASS INDEX: 31.37 KG/M2 | RESPIRATION RATE: 16 BRPM | DIASTOLIC BLOOD PRESSURE: 74 MMHG | TEMPERATURE: 98.1 F | WEIGHT: 207 LBS | HEART RATE: 80 BPM

## 2024-05-30 DIAGNOSIS — O99.212 OBESITY AFFECTING PREGNANCY IN SECOND TRIMESTER, UNSPECIFIED OBESITY TYPE: ICD-10-CM

## 2024-05-30 DIAGNOSIS — Z3A.25 25 WEEKS GESTATION OF PREGNANCY: ICD-10-CM

## 2024-05-30 DIAGNOSIS — O09.899 SHORT INTERVAL BETWEEN PREGNANCIES COMPLICATING PREGNANCY, ANTEPARTUM: ICD-10-CM

## 2024-05-30 DIAGNOSIS — O99.282 METHYLENETETRAHYDROFOLATE REDUCTASE DEFICIENCY AFFECTING PREGNANCY IN SECOND TRIMESTER (HCC): ICD-10-CM

## 2024-05-30 DIAGNOSIS — E72.12 METHYLENETETRAHYDROFOLATE REDUCTASE DEFICIENCY AFFECTING PREGNANCY IN SECOND TRIMESTER (HCC): ICD-10-CM

## 2024-05-30 DIAGNOSIS — O09.299 PREVIOUS STILLBIRTH OR DEMISE, ANTEPARTUM: Primary | ICD-10-CM

## 2024-05-30 DIAGNOSIS — O35.BXX0 FETAL CARDIAC ECHOGENIC FOCUS, ANTEPARTUM, SINGLE OR UNSPECIFIED FETUS: ICD-10-CM

## 2024-05-30 DIAGNOSIS — Z36.4 ULTRASOUND FOR ANTENATAL SCREENING FOR FETAL GROWTH RESTRICTION: ICD-10-CM

## 2024-05-30 PROCEDURE — 76816 OB US FOLLOW-UP PER FETUS: CPT | Performed by: OBSTETRICS & GYNECOLOGY

## 2024-05-30 PROCEDURE — 99999 PR OFFICE/OUTPT VISIT,PROCEDURE ONLY: CPT | Performed by: OBSTETRICS & GYNECOLOGY

## 2024-05-30 PROCEDURE — 76820 UMBILICAL ARTERY ECHO: CPT | Performed by: OBSTETRICS & GYNECOLOGY

## 2024-06-03 ENCOUNTER — HOSPITAL ENCOUNTER (EMERGENCY)
Age: 19
Discharge: HOME | End: 2024-06-03
Attending: EMERGENCY MEDICINE
Payer: COMMERCIAL

## 2024-06-03 VITALS
TEMPERATURE: 98 F | SYSTOLIC BLOOD PRESSURE: 115 MMHG | DIASTOLIC BLOOD PRESSURE: 79 MMHG | RESPIRATION RATE: 19 BRPM | OXYGEN SATURATION: 98 % | BODY MASS INDEX: 32.8 KG/M2 | WEIGHT: 209 LBS | HEIGHT: 67 IN | HEART RATE: 98 BPM

## 2024-06-03 DIAGNOSIS — Z3A.26 26 WEEKS GESTATION OF PREGNANCY: ICD-10-CM

## 2024-06-03 DIAGNOSIS — B34.9 VIRAL ILLNESS: Primary | ICD-10-CM

## 2024-06-03 LAB
SPECIMEN SOURCE: NORMAL
STREP A, MOLECULAR: NEGATIVE

## 2024-06-03 PROCEDURE — 87651 STREP A DNA AMP PROBE: CPT

## 2024-06-03 PROCEDURE — 99283 EMERGENCY DEPT VISIT LOW MDM: CPT

## 2024-06-03 ASSESSMENT — PAIN DESCRIPTION - PAIN TYPE: TYPE: ACUTE PAIN

## 2024-06-03 ASSESSMENT — PAIN DESCRIPTION - LOCATION: LOCATION: THROAT

## 2024-06-03 ASSESSMENT — PAIN SCALES - GENERAL: PAINLEVEL_OUTOF10: 3

## 2024-06-03 ASSESSMENT — LIFESTYLE VARIABLES
HOW MANY STANDARD DRINKS CONTAINING ALCOHOL DO YOU HAVE ON A TYPICAL DAY: PATIENT DOES NOT DRINK
HOW OFTEN DO YOU HAVE A DRINK CONTAINING ALCOHOL: NEVER

## 2024-06-03 ASSESSMENT — PAIN - FUNCTIONAL ASSESSMENT: PAIN_FUNCTIONAL_ASSESSMENT: 0-10

## 2024-06-03 ASSESSMENT — PAIN DESCRIPTION - DESCRIPTORS: DESCRIPTORS: ACHING

## 2024-06-03 NOTE — ED PROVIDER NOTES
eMERGENCY dEPARTMENT eNCOUnter        CHIEF COMPLAINT    Chief Complaint   Patient presents with    Pharyngitis     Sore throat, diarrhea, and weakness for the past 2 days. Patient is approximately 26-27 weeks pregnant. Denies emesis.        HPI    Bibiana Dangelo is a 18 y.o. female who presents to ED with sore throat diarrhea and weakness for the past couple days.  Patient is 26 gestational weeks pregnant.  REVIEW OF SYSTEMS    All systems reviewed and positives are in the HPI      PAST MEDICAL HISTORY    Past Medical History:   Diagnosis Date    Acute depression 2020    ADHD (attention deficit hyperactivity disorder)     Headache      delivery     Strep throat     Trauma        SURGICAL HISTORY    History reviewed. No pertinent surgical history.    CURRENT MEDICATIONS    Current Outpatient Rx   Medication Sig Dispense Refill    WESTAB MAX 2.5-25-2 MG TABS Take 1 tablet by mouth daily (Patient not taking: Reported on 2024)      Prenatal Vit-Fe Fumarate-FA (PRENATAL VITAMINS) 28-0.8 MG TABS Take 1 tablet by mouth daily 90 tablet 3       ALLERGIES    No Known Allergies    FAMILY HISTORY    Family History   Problem Relation Age of Onset    Mental Illness Maternal Grandmother     Arthritis Maternal Grandmother     Diabetes Maternal Grandmother     Prostate Cancer Maternal Grandfather     Breast Cancer Neg Hx     Ovarian Cancer Neg Hx     Stroke Neg Hx        SOCIAL HISTORY    Social History     Socioeconomic History    Marital status: Single     Spouse name: None    Number of children: None    Years of education: None    Highest education level: None   Tobacco Use    Smoking status: Never    Smokeless tobacco: Never   Vaping Use    Vaping Use: Never used   Substance and Sexual Activity    Alcohol use: Never    Drug use: Never    Sexual activity: Yes     Partners: Male     Comment: none     Social Determinants of Health     Financial Resource Strain: Low Risk  (2023)    Overall Financial

## 2024-06-07 ENCOUNTER — HOSPITAL ENCOUNTER (OUTPATIENT)
Age: 19
Setting detail: SPECIMEN
Discharge: HOME | End: 2024-06-07
Payer: COMMERCIAL

## 2024-06-07 ENCOUNTER — ROUTINE PRENATAL (OUTPATIENT)
Dept: OBGYN CLINIC | Age: 19
End: 2024-06-07

## 2024-06-07 VITALS
DIASTOLIC BLOOD PRESSURE: 72 MMHG | HEART RATE: 96 BPM | WEIGHT: 210 LBS | SYSTOLIC BLOOD PRESSURE: 118 MMHG | BODY MASS INDEX: 32.89 KG/M2

## 2024-06-07 DIAGNOSIS — Z3A.26 26 WEEKS GESTATION OF PREGNANCY: ICD-10-CM

## 2024-06-07 DIAGNOSIS — Z34.92 NORMAL PREGNANCY IN SECOND TRIMESTER: Primary | ICD-10-CM

## 2024-06-07 LAB
GLUCOSE 1H P 50 G GLC PO SERPL-MCNC: 133 MG/DL (ref 70–135)
GLUCOSE ADMINISTRATION: NORMAL

## 2024-06-07 PROCEDURE — 82950 GLUCOSE TEST: CPT

## 2024-06-07 SDOH — ECONOMIC STABILITY: INCOME INSECURITY: HOW HARD IS IT FOR YOU TO PAY FOR THE VERY BASICS LIKE FOOD, HOUSING, MEDICAL CARE, AND HEATING?: NOT HARD AT ALL

## 2024-06-07 SDOH — ECONOMIC STABILITY: FOOD INSECURITY: WITHIN THE PAST 12 MONTHS, YOU WORRIED THAT YOUR FOOD WOULD RUN OUT BEFORE YOU GOT MONEY TO BUY MORE.: NEVER TRUE

## 2024-06-07 SDOH — ECONOMIC STABILITY: FOOD INSECURITY: WITHIN THE PAST 12 MONTHS, THE FOOD YOU BOUGHT JUST DIDN'T LAST AND YOU DIDN'T HAVE MONEY TO GET MORE.: NEVER TRUE

## 2024-06-07 SDOH — ECONOMIC STABILITY: HOUSING INSECURITY
IN THE LAST 12 MONTHS, WAS THERE A TIME WHEN YOU DID NOT HAVE A STEADY PLACE TO SLEEP OR SLEPT IN A SHELTER (INCLUDING NOW)?: NO

## 2024-06-07 NOTE — PROGRESS NOTES
tolerance, 1 hour    CBC        Discussed signs and symptoms of preeclampsia, hayden salazar versus  labor, and fetal movement    1 hour GTT in process    30 week growth US scheduled for 24          I am having Bibiana Dangelo maintain her Prenatal Vitamins and WesTab Max. We will continue to administer nitrofurantoin (macrocrystal-monohydrate).    Return in about 3 weeks (around 2024) for growth US and routine OB.    There are no Patient Instructions on file for this visit.             RAKESH Bueno CNM,2024 2:40 PM

## 2024-06-28 ENCOUNTER — ROUTINE PRENATAL (OUTPATIENT)
Dept: OBGYN CLINIC | Age: 19
End: 2024-06-28
Payer: COMMERCIAL

## 2024-06-28 VITALS
BODY MASS INDEX: 33.67 KG/M2 | HEART RATE: 96 BPM | SYSTOLIC BLOOD PRESSURE: 118 MMHG | WEIGHT: 215 LBS | DIASTOLIC BLOOD PRESSURE: 76 MMHG

## 2024-06-28 DIAGNOSIS — Z34.93 NORMAL PREGNANCY IN THIRD TRIMESTER: Primary | ICD-10-CM

## 2024-06-28 DIAGNOSIS — Z3A.29 29 WEEKS GESTATION OF PREGNANCY: ICD-10-CM

## 2024-06-28 DIAGNOSIS — Z23 NEED FOR TDAP VACCINATION: ICD-10-CM

## 2024-06-28 PROCEDURE — 99213 OFFICE O/P EST LOW 20 MIN: CPT

## 2024-06-28 PROCEDURE — G8427 DOCREV CUR MEDS BY ELIG CLIN: HCPCS

## 2024-06-28 PROCEDURE — 1036F TOBACCO NON-USER: CPT

## 2024-06-28 PROCEDURE — G8417 CALC BMI ABV UP PARAM F/U: HCPCS

## 2024-06-28 NOTE — PROGRESS NOTES
Bibiana Dangelo is here at 29w5d for:    Chief Complaint   Patient presents with    Routine Prenatal Visit     Pt presents today for routine ob care following in house usn. No questions or concerns at this time.        Estimated Due Date: Estimated Date of Delivery: 24    OB History    Para Term  AB Living   2 1 0 0 0 0   SAB IAB Ectopic Molar Multiple Live Births   0 0 0 0 0 0      # Outcome Date GA Lbr Holger/2nd Weight Sex Delivery Anes PTL Lv   2 Current            1 Para 23 19w5d  0.12 kg (4.2 oz)  Vag-Spont None N FD      Complications: Fetal demise before 22 weeks with retention of dead fetus        Past Medical History:   Diagnosis Date    Acute depression 2020    ADHD (attention deficit hyperactivity disorder)     Headache      delivery     Strep throat     Trauma        History reviewed. No pertinent surgical history.    Social History     Tobacco Use   Smoking Status Never   Smokeless Tobacco Never        Social History     Substance and Sexual Activity   Alcohol Use Never               HPI: Patient presents for a routine OB visit and declines concerns. She reports fetal movement is present. She declines contractions, vaginal bleeding, and leaking of fluids.        PT denies fever, chills, nausea and vomiting       Vitals:  Estimated body mass index is 33.67 kg/m² as calculated from the following:    Height as of 6/3/24: 1.702 m (5' 7\").    Weight as of this encounter: 97.5 kg (215 lb).  BP: 118/76  Weight - Scale: 97.5 kg (215 lb)  Pulse: 96  Patient Position: Sitting  Albumin: Negative  Glucose: Negative  Fetal HR: USN  Movement: Present  Presentation: Breech           The patient is RH positive Rhogam Ordered no    T-Dap Vaccine (27-36 weeks): completed      Abdomen: soft, nontender    Results reviewed today:    Growth Ultrasound     Viable 30 week, 4 day SIUP  EFW= 72%  Breech  Anterior placenta  SHADIA= 18.9 cm  Cx length= 3.9 cm  Echogenic foci was visualized in

## 2024-07-12 ENCOUNTER — ROUTINE PRENATAL (OUTPATIENT)
Dept: OBGYN CLINIC | Age: 19
End: 2024-07-12

## 2024-07-12 VITALS
WEIGHT: 217 LBS | DIASTOLIC BLOOD PRESSURE: 76 MMHG | BODY MASS INDEX: 33.99 KG/M2 | SYSTOLIC BLOOD PRESSURE: 120 MMHG | HEART RATE: 110 BPM

## 2024-07-12 DIAGNOSIS — Z34.93 NORMAL PREGNANCY IN THIRD TRIMESTER: Primary | ICD-10-CM

## 2024-07-12 DIAGNOSIS — O09.293 HISTORY OF PREGNANCY LOSS IN PRIOR PREGNANCY, CURRENTLY PREGNANT IN THIRD TRIMESTER: ICD-10-CM

## 2024-07-12 DIAGNOSIS — Z3A.31 31 WEEKS GESTATION OF PREGNANCY: ICD-10-CM

## 2024-07-12 NOTE — PROGRESS NOTES
Bibiana Dangelo is here at 31w5d for:    Chief Complaint   Patient presents with    Routine Prenatal Visit     Pt presents today for routine ob care. No questions or concerns at this time.        Estimated Due Date: Estimated Date of Delivery: 24    OB History    Para Term  AB Living   2 1 0 0 0 0   SAB IAB Ectopic Molar Multiple Live Births   0 0 0 0 0 0      # Outcome Date GA Lbr Holger/2nd Weight Sex Delivery Anes PTL Lv   2 Current            1 Para 23 19w5d  0.12 kg (4.2 oz)  Vag-Spont None N FD      Complications: Fetal demise before 22 weeks with retention of dead fetus        Past Medical History:   Diagnosis Date    Acute depression 2020    ADHD (attention deficit hyperactivity disorder)     Headache      delivery     Strep throat     Trauma        History reviewed. No pertinent surgical history.    Social History     Tobacco Use   Smoking Status Never   Smokeless Tobacco Never        Social History     Substance and Sexual Activity   Alcohol Use Never               HPI: Patient presents for a routine OB visit and declines concerns. She reports fetal movement is present. She declines contractions, vaginal bleeding, and leaking of fluids.        PT denies fever, chills, nausea and vomiting       Vitals:  Estimated body mass index is 33.99 kg/m² as calculated from the following:    Height as of 6/3/24: 1.702 m (5' 7\").    Weight as of this encounter: 98.4 kg (217 lb).  BP: 120/76  Weight - Scale: 98.4 kg (217 lb)  Pulse: (!) 110  Patient Position: Sitting  Fetal HR: 145  Movement: Present  Presentation: Unknown           The patient is RH positive Rhogam Ordered no    T-Dap Vaccine (27-36 weeks): completed      Abdomen: soft, nontender    Results reviewed today:    No results found for this visit on 24.        ASSESSMENT & Plan    Diagnosis Orders   1. Normal pregnancy in third trimester        2. 31 weeks gestation of pregnancy        3. History of pregnancy

## 2024-07-12 NOTE — CONSULTS
Session ID: [id number]  Language: Yakut   ID: #[id number]   Name: Arnulfo Urine sample sent to lab.   Pt ambulated to restroom.

## 2024-07-13 ENCOUNTER — HOSPITAL ENCOUNTER (OUTPATIENT)
Age: 19
Discharge: HOME | End: 2024-07-13
Attending: OBSTETRICS & GYNECOLOGY | Admitting: OBSTETRICS & GYNECOLOGY
Payer: COMMERCIAL

## 2024-07-13 PROBLEM — O36.8190 DECREASED FETAL MOVEMENT AFFECTING MANAGEMENT OF MOTHER, ANTEPARTUM: Status: ACTIVE | Noted: 2024-07-13

## 2024-07-13 PROCEDURE — 99213 OFFICE O/P EST LOW 20 MIN: CPT

## 2024-07-13 PROCEDURE — 59025 FETAL NON-STRESS TEST: CPT

## 2024-07-13 NOTE — FLOWSHEET NOTE
Pt arrives to department ambulatory with c/o decreased FM since 1am. Pt placed on EFM, instructed how to perform NST.

## 2024-07-13 NOTE — PROGRESS NOTES
Bibiana Dangelo is here in L&D at 31w6d for: decreased FM                Findings  Baseline: 145 BPM  Baseline Classification: Normal  Variability: Moderate  Decelerations: None  Accelerations: Yes  Acoustic Stimulator: No  Fetal Movement: Yes  Multiple Gestation?: No  Uterine contractions/10 min.: 0  Interpretation: Reactive  Interpreted by: NENA Brice RN / SONYA Villareal RN             NST Time start:  1737   NST Time stop:   1757      NST is reactive. Quality of tracing is satisfactory.

## 2024-07-13 NOTE — FLOWSHEET NOTE
Discharge instructions reviewed with patient.  Patient verbalized understanding and denies any questions.  Discharge papers signed and then given to patient.  Patient discharged from unit with understanding of follow up care.   No signs of distress noted.

## 2024-07-13 NOTE — DISCHARGE INSTRUCTIONS
OUTPATIENT DISCHARGE  Dr. Adolfo Ness [facility]  Dr. Giselle Mcfarland [facility]  [address]  [address] [phone]   or [place] [phone]     ACTIVITY LIMITATIONS:  ( x )Up and about as desired and tolerated  (  )Up to bathroom only  (  )Lay on either side  (  )Avoid heavy lifting or exercise  (  )No sex  (  )No nipple stimulation  (  )Complet bedrest  (  )Avoid using stairs  ( x )Increase fluids  **DRINK AT LEAST eight-8oz. Glasses of water daily.**    Call your Doctor if:  ( )Contractions are every 5 minutes apart (from start of one to the start of the next contraction) lasting 60 seconds for at least 1 hour, strong enough you can not walk or talk through the contraction and regular.  ( x )Bag of water breaks  ( x )Vaginal bleeding  ( x )Unusual pain occurs  ( x )Decreased fetal movement  ( x ) labor:  If you have 4 contractions in an hour    Keep your scheduled follow up appointment.  Or call for a follow up on________________________________________________.    IN CASE OF EMERGENCY CONTACT LABOR AND DELIVERY [phone].

## 2024-08-16 ENCOUNTER — HOSPITAL ENCOUNTER (OUTPATIENT)
Age: 19
Setting detail: SPECIMEN
Discharge: HOME | End: 2024-08-16
Payer: COMMERCIAL

## 2024-08-16 ENCOUNTER — ROUTINE PRENATAL (OUTPATIENT)
Dept: OBGYN CLINIC | Age: 19
End: 2024-08-16

## 2024-08-16 VITALS
SYSTOLIC BLOOD PRESSURE: 118 MMHG | DIASTOLIC BLOOD PRESSURE: 76 MMHG | WEIGHT: 230 LBS | HEART RATE: 124 BPM | BODY MASS INDEX: 36.02 KG/M2

## 2024-08-16 DIAGNOSIS — R30.0 DYSURIA: ICD-10-CM

## 2024-08-16 DIAGNOSIS — Z34.93 NORMAL PREGNANCY IN THIRD TRIMESTER: Primary | ICD-10-CM

## 2024-08-16 DIAGNOSIS — Z3A.36 36 WEEKS GESTATION OF PREGNANCY: ICD-10-CM

## 2024-08-16 DIAGNOSIS — R10.2 VAGINAL PAIN: ICD-10-CM

## 2024-08-16 DIAGNOSIS — Z34.93 NORMAL PREGNANCY IN THIRD TRIMESTER: ICD-10-CM

## 2024-08-16 PROCEDURE — 87086 URINE CULTURE/COLONY COUNT: CPT

## 2024-08-16 PROCEDURE — 87480 CANDIDA DNA DIR PROBE: CPT

## 2024-08-16 PROCEDURE — 87660 TRICHOMONAS VAGIN DIR PROBE: CPT

## 2024-08-16 PROCEDURE — 87081 CULTURE SCREEN ONLY: CPT

## 2024-08-16 PROCEDURE — 87510 GARDNER VAG DNA DIR PROBE: CPT

## 2024-08-16 NOTE — PROGRESS NOTES
Bibiana Dangelo is here at 36w5d for:    Chief Complaint   Patient presents with    Routine Prenatal Visit     Pt presents today for routine ob care following in house usn. Pt complains of foam when urinating and not knowing if that is normal, she is also having pain and discomfort with this.        Estimated Due Date: Estimated Date of Delivery: 24    OB History    Para Term  AB Living   2 1 0 0 0 0   SAB IAB Ectopic Molar Multiple Live Births   0 0 0 0 0 0      # Outcome Date GA Lbr Holger/2nd Weight Sex Type Anes PTL Lv   2 Current            1 Para 23 19w5d  0.12 kg (4.2 oz)  Vag-Spont None N FD      Complications: Fetal demise before 22 weeks with retention of dead fetus        Past Medical History:   Diagnosis Date    Acute depression 2020    ADHD (attention deficit hyperactivity disorder)     Headache      delivery     Strep throat     Trauma        History reviewed. No pertinent surgical history.    Social History     Tobacco Use   Smoking Status Never   Smokeless Tobacco Never        Social History     Substance and Sexual Activity   Alcohol Use Never               HPI: Patient presents for a routine OB visit and reports she has noticed that her urine has looked foamy and she has noticed some vaginal pain. This started on Tuesday. She has not tried anything for this at home. She reports fetal movement is present. She declines contractions, vaginal bleeding, and leaking of fluids.      PT denies fever, chills, nausea and vomiting       Vitals:  Estimated body mass index is 36.02 kg/m² as calculated from the following:    Height as of 6/3/24: 1.702 m (5' 7\").    Weight as of this encounter: 104.3 kg (230 lb).  BP: 118/76  Weight - Scale: 104.3 kg (230 lb)  Pulse: (!) 124  Patient Position: Sitting  Fetal HR: USN  Movement: Present  Presentation: Vertex           The patient is RH positive Rhogam Ordered no    T-Dap Vaccine (27-36 weeks): completed      Abdomen: soft,

## 2024-08-17 LAB
MICROORGANISM SPEC CULT: NORMAL
MICROORGANISM SPEC CULT: NORMAL
SERVICE CMNT-IMP: NORMAL
SERVICE CMNT-IMP: NORMAL
SPECIMEN DESCRIPTION: NORMAL
SPECIMEN DESCRIPTION: NORMAL

## 2024-08-18 LAB
CANDIDA SPECIES: POSITIVE
GARDNERELLA VAGINALIS: POSITIVE
SOURCE: ABNORMAL
TRICHOMONAS: NEGATIVE

## 2024-08-19 ENCOUNTER — TELEPHONE (OUTPATIENT)
Dept: OBGYN | Age: 19
End: 2024-08-19

## 2024-08-19 LAB
MICROORGANISM SPEC CULT: NORMAL
SERVICE CMNT-IMP: NORMAL
SPECIMEN DESCRIPTION: NORMAL

## 2024-08-19 RX ORDER — METRONIDAZOLE 500 MG/1
500 TABLET ORAL 2 TIMES DAILY
Qty: 14 TABLET | Refills: 0 | Status: SHIPPED | OUTPATIENT
Start: 2024-08-19 | End: 2024-08-26

## 2024-08-19 NOTE — TELEPHONE ENCOUNTER
Tried to call pt in regards to results, kept ringing and no voicemail.    Call patient mom since she is listed on forms. She speaks english but kept the  on the line, she will make sure pt gets these antibiotics.

## 2024-08-23 ENCOUNTER — ROUTINE PRENATAL (OUTPATIENT)
Dept: OBGYN CLINIC | Age: 19
End: 2024-08-23

## 2024-08-23 VITALS
BODY MASS INDEX: 36.65 KG/M2 | DIASTOLIC BLOOD PRESSURE: 72 MMHG | HEART RATE: 110 BPM | WEIGHT: 234 LBS | SYSTOLIC BLOOD PRESSURE: 118 MMHG

## 2024-08-23 DIAGNOSIS — Z34.93 NORMAL PREGNANCY IN THIRD TRIMESTER: Primary | ICD-10-CM

## 2024-08-23 DIAGNOSIS — Z3A.37 37 WEEKS GESTATION OF PREGNANCY: ICD-10-CM

## 2024-08-23 NOTE — PROGRESS NOTES
Bibiana Dangelo is here at 37w5d for:    Chief Complaint   Patient presents with    Routine Prenatal Visit     Pt presents today for routine ob care. Pt has not started antibiotics yet, reviewed bv and yeast results with patient. No questions or concerns at this time.        Estimated Due Date: Estimated Date of Delivery: 24    OB History    Para Term  AB Living   2 1 0 0 0 0   SAB IAB Ectopic Molar Multiple Live Births   0 0 0 0 0 0      # Outcome Date GA Lbr Holger/2nd Weight Sex Type Anes PTL Lv   2 Current            1 Para 23 19w5d  0.12 kg (4.2 oz)  Vag-Spont None N FD      Complications: Fetal demise before 22 weeks with retention of dead fetus        Past Medical History:   Diagnosis Date    Acute depression 2020    ADHD (attention deficit hyperactivity disorder)     Headache      delivery     Strep throat     Trauma        History reviewed. No pertinent surgical history.    Social History     Tobacco Use   Smoking Status Never   Smokeless Tobacco Never        Social History     Substance and Sexual Activity   Alcohol Use Never               HPI: Patient presents for a routine OB visit and declines concerns. She reports fetal movement is present. She declines contractions, vaginal bleeding, and leaking of fluids.        PT denies fever, chills, nausea and vomiting       Vitals:  Estimated body mass index is 36.65 kg/m² as calculated from the following:    Height as of 6/3/24: 1.702 m (5' 7\").    Weight as of this encounter: 106.1 kg (234 lb).  BP: 118/72  Weight - Scale: 106.1 kg (234 lb)  Pulse: (!) 110  Patient Position: Sitting  Fundal Height (cm): 37 cm  Fetal HR: 135  Movement: Present  Presentation: Unknown           The patient is RH positive Rhogam Ordered no    T-Dap Vaccine (27-36 weeks): completed      Abdomen: soft, nontender    Results reviewed today:    No results found for this visit on 24.        ASSESSMENT & Plan    Diagnosis Orders   1. Normal

## 2024-08-30 ENCOUNTER — ROUTINE PRENATAL (OUTPATIENT)
Dept: OBGYN CLINIC | Age: 19
End: 2024-08-30

## 2024-08-30 VITALS
SYSTOLIC BLOOD PRESSURE: 122 MMHG | BODY MASS INDEX: 36.02 KG/M2 | WEIGHT: 230 LBS | HEART RATE: 92 BPM | DIASTOLIC BLOOD PRESSURE: 80 MMHG

## 2024-08-30 DIAGNOSIS — Z34.93 NORMAL PREGNANCY IN THIRD TRIMESTER: Primary | ICD-10-CM

## 2024-08-30 DIAGNOSIS — Z3A.38 38 WEEKS GESTATION OF PREGNANCY: ICD-10-CM

## 2024-08-30 NOTE — PROGRESS NOTES
Bibiana Dangelo is here at 38w5d for:    Chief Complaint   Patient presents with    Routine Prenatal Visit     Pt presents today for routine ob care. No questions or concerns at this time.        Estimated Due Date: Estimated Date of Delivery: 24    OB History    Para Term  AB Living   2 1 0 0 0 0   SAB IAB Ectopic Molar Multiple Live Births   0 0 0 0 0 0      # Outcome Date GA Lbr Holger/2nd Weight Sex Type Anes PTL Lv   2 Current            1 Para 23 19w5d  0.12 kg (4.2 oz)  Vag-Spont None N FD      Complications: Fetal demise before 22 weeks with retention of dead fetus        Past Medical History:   Diagnosis Date    Acute depression 2020    ADHD (attention deficit hyperactivity disorder)     Headache      delivery     Strep throat     Trauma        History reviewed. No pertinent surgical history.    Social History     Tobacco Use   Smoking Status Never   Smokeless Tobacco Never        Social History     Substance and Sexual Activity   Alcohol Use Never               HPI: Patient presents for a routine OB visit and declines concerns. She reports fetal movement is present. She declines contractions, vaginal bleeding, and leaking of fluids.        PT denies fever, chills, nausea and vomiting       Vitals:  Estimated body mass index is 36.02 kg/m² as calculated from the following:    Height as of 6/3/24: 1.702 m (5' 7\").    Weight as of this encounter: 104.3 kg (230 lb).  BP: 122/80  Weight - Scale: 104.3 kg (230 lb)  Pulse: 92  Patient Position: Sitting  Albumin: Negative  Glucose: Negative  Fetal HR: 140  Movement: Present  Presentation: Vertex  Dilation (cm): 1  Effacement: 80  Station: -2           The patient is RH positive Rhogam Ordered no    T-Dap Vaccine (27-36 weeks): completed      Abdomen: soft, nontender    Results reviewed today:    No results found for this visit on 24.        ASSESSMENT & Plan    Diagnosis Orders   1. Normal pregnancy in third trimester

## 2024-09-04 ENCOUNTER — HOSPITAL ENCOUNTER (INPATIENT)
Age: 19
LOS: 2 days | Discharge: HOME | End: 2024-09-06
Attending: ADVANCED PRACTICE MIDWIFE | Admitting: ADVANCED PRACTICE MIDWIFE
Payer: COMMERCIAL

## 2024-09-04 ENCOUNTER — ANESTHESIA (OUTPATIENT)
Dept: LABOR AND DELIVERY | Age: 19
End: 2024-09-04
Payer: COMMERCIAL

## 2024-09-04 ENCOUNTER — ANESTHESIA EVENT (OUTPATIENT)
Dept: LABOR AND DELIVERY | Age: 19
End: 2024-09-04
Payer: COMMERCIAL

## 2024-09-04 ENCOUNTER — HOSPITAL ENCOUNTER (EMERGENCY)
Age: 19
Discharge: HOME | End: 2024-09-04
Attending: EMERGENCY MEDICINE
Payer: COMMERCIAL

## 2024-09-04 VITALS
HEIGHT: 68 IN | RESPIRATION RATE: 18 BRPM | DIASTOLIC BLOOD PRESSURE: 96 MMHG | HEART RATE: 105 BPM | WEIGHT: 230 LBS | OXYGEN SATURATION: 97 % | SYSTOLIC BLOOD PRESSURE: 142 MMHG | BODY MASS INDEX: 34.86 KG/M2 | TEMPERATURE: 98 F

## 2024-09-04 DIAGNOSIS — Z3A.39 PREGNANCY WITH 39 COMPLETED WEEKS GESTATION: Primary | ICD-10-CM

## 2024-09-04 DIAGNOSIS — O47.9 UTERINE CONTRACTIONS: ICD-10-CM

## 2024-09-04 PROBLEM — Z34.90 TERM PREGNANCY: Status: ACTIVE | Noted: 2024-09-04

## 2024-09-04 LAB
ABO + RH BLD: NORMAL
ALBUMIN SERPL-MCNC: 3.4 G/DL (ref 3.5–5.2)
ALBUMIN/GLOB SERPL: 1 {RATIO} (ref 1–2.5)
ALP SERPL-CCNC: 509 U/L (ref 35–104)
ALT SERPL-CCNC: 113 U/L (ref 10–35)
ANION GAP SERPL CALCULATED.3IONS-SCNC: 14 MMOL/L (ref 9–16)
ARM BAND NUMBER: NORMAL
AST SERPL-CCNC: 105 U/L (ref 10–35)
BASOPHILS # BLD: 0.06 K/UL (ref 0–0.2)
BASOPHILS NFR BLD: 1 % (ref 0–2)
BILIRUB SERPL-MCNC: 0.2 MG/DL (ref 0–1.2)
BILIRUB UR QL STRIP: NEGATIVE
BLOOD BANK SAMPLE EXPIRATION: NORMAL
BLOOD GROUP ANTIBODIES SERPL: NEGATIVE
BUN SERPL-MCNC: 11 MG/DL (ref 6–20)
BUN/CREAT SERPL: 9 (ref 9–20)
CALCIUM SERPL-MCNC: 9.5 MG/DL (ref 8.6–10.4)
CHLORIDE SERPL-SCNC: 107 MMOL/L (ref 98–107)
CLARITY UR: CLEAR
CO2 SERPL-SCNC: 19 MMOL/L (ref 20–31)
COLOR UR: YELLOW
CREAT SERPL-MCNC: 1.2 MG/DL (ref 0.5–0.9)
EOSINOPHIL # BLD: 0.12 K/UL (ref 0–0.44)
EOSINOPHILS RELATIVE PERCENT: 1 % (ref 1–4)
ERYTHROCYTE [DISTWIDTH] IN BLOOD BY AUTOMATED COUNT: 15.6 % (ref 11.8–14.4)
GFR, ESTIMATED: 69 ML/MIN/1.73M2
GLUCOSE SERPL-MCNC: 110 MG/DL (ref 74–99)
GLUCOSE UR STRIP-MCNC: NEGATIVE MG/DL
HCT VFR BLD AUTO: 39.7 % (ref 36.3–47.1)
HGB BLD-MCNC: 13 G/DL (ref 11.9–15.1)
HGB UR QL STRIP.AUTO: NEGATIVE
IMM GRANULOCYTES # BLD AUTO: 0.1 K/UL (ref 0–0.3)
IMM GRANULOCYTES NFR BLD: 1 %
KETONES UR STRIP-MCNC: NEGATIVE MG/DL
LEUKOCYTE ESTERASE UR QL STRIP: NEGATIVE
LYMPHOCYTES NFR BLD: 3.04 K/UL (ref 1.2–5.2)
LYMPHOCYTES RELATIVE PERCENT: 30 % (ref 25–45)
MCH RBC QN AUTO: 27.5 PG (ref 25.2–33.5)
MCHC RBC AUTO-ENTMCNC: 32.7 G/DL (ref 28.4–34.8)
MCV RBC AUTO: 84.1 FL (ref 82.6–102.9)
MONOCYTES NFR BLD: 0.66 K/UL (ref 0.1–1.4)
MONOCYTES NFR BLD: 7 % (ref 2–8)
NEUTROPHILS NFR BLD: 60 % (ref 34–64)
NEUTS SEG NFR BLD: 6.22 K/UL (ref 1.8–8)
NITRITE UR QL STRIP: NEGATIVE
NRBC BLD-RTO: 0 PER 100 WBC
PH UR STRIP: 6 [PH] (ref 5–9)
PLATELET # BLD AUTO: 181 K/UL (ref 138–453)
PMV BLD AUTO: 13.7 FL (ref 8.1–13.5)
POTASSIUM SERPL-SCNC: 4.3 MMOL/L (ref 3.7–5.3)
PROT SERPL-MCNC: 6.9 G/DL (ref 6.6–8.7)
PROT UR STRIP-MCNC: NEGATIVE MG/DL
RBC # BLD AUTO: 4.72 M/UL (ref 3.95–5.11)
SODIUM SERPL-SCNC: 140 MMOL/L (ref 136–145)
SP GR UR STRIP: 1.01 (ref 1.01–1.02)
UROBILINOGEN UR STRIP-ACNC: NORMAL EU/DL (ref 0–1)
WBC OTHER # BLD: 10.2 K/UL (ref 4.5–13.5)

## 2024-09-04 PROCEDURE — 6360000002 HC RX W HCPCS: Performed by: NURSE ANESTHETIST, CERTIFIED REGISTERED

## 2024-09-04 PROCEDURE — 85025 COMPLETE CBC W/AUTO DIFF WBC: CPT

## 2024-09-04 PROCEDURE — 6360000002 HC RX W HCPCS: Performed by: ADVANCED PRACTICE MIDWIFE

## 2024-09-04 PROCEDURE — 99282 EMERGENCY DEPT VISIT SF MDM: CPT

## 2024-09-04 PROCEDURE — 2580000003 HC RX 258: Performed by: ADVANCED PRACTICE MIDWIFE

## 2024-09-04 PROCEDURE — 86850 RBC ANTIBODY SCREEN: CPT

## 2024-09-04 PROCEDURE — 2500000003 HC RX 250 WO HCPCS

## 2024-09-04 PROCEDURE — 82570 ASSAY OF URINE CREATININE: CPT

## 2024-09-04 PROCEDURE — 1220000000 HC SEMI PRIVATE OB R&B

## 2024-09-04 PROCEDURE — 81003 URINALYSIS AUTO W/O SCOPE: CPT

## 2024-09-04 PROCEDURE — 7200000001 HC VAGINAL DELIVERY

## 2024-09-04 PROCEDURE — 6370000000 HC RX 637 (ALT 250 FOR IP): Performed by: ADVANCED PRACTICE MIDWIFE

## 2024-09-04 PROCEDURE — 3700000025 EPIDURAL BLOCK: Performed by: NURSE ANESTHETIST, CERTIFIED REGISTERED

## 2024-09-04 PROCEDURE — 59409 OBSTETRICAL CARE: CPT | Performed by: ADVANCED PRACTICE MIDWIFE

## 2024-09-04 PROCEDURE — 0HQ9XZZ REPAIR PERINEUM SKIN, EXTERNAL APPROACH: ICD-10-PCS | Performed by: ADVANCED PRACTICE MIDWIFE

## 2024-09-04 PROCEDURE — 80053 COMPREHEN METABOLIC PANEL: CPT

## 2024-09-04 PROCEDURE — 6360000002 HC RX W HCPCS

## 2024-09-04 PROCEDURE — 51702 INSERT TEMP BLADDER CATH: CPT

## 2024-09-04 PROCEDURE — 84156 ASSAY OF PROTEIN URINE: CPT

## 2024-09-04 PROCEDURE — 86901 BLOOD TYPING SEROLOGIC RH(D): CPT

## 2024-09-04 PROCEDURE — 86900 BLOOD TYPING SEROLOGIC ABO: CPT

## 2024-09-04 RX ORDER — SODIUM CHLORIDE, SODIUM LACTATE, POTASSIUM CHLORIDE, CALCIUM CHLORIDE 600; 310; 30; 20 MG/100ML; MG/100ML; MG/100ML; MG/100ML
INJECTION, SOLUTION INTRAVENOUS CONTINUOUS
Status: DISCONTINUED | OUTPATIENT
Start: 2024-09-04 | End: 2024-09-04

## 2024-09-04 RX ORDER — LIDOCAINE HYDROCHLORIDE 10 MG/ML
30 INJECTION, SOLUTION EPIDURAL; INFILTRATION; INTRACAUDAL; PERINEURAL PRN
Status: DISCONTINUED | OUTPATIENT
Start: 2024-09-04 | End: 2024-09-04

## 2024-09-04 RX ORDER — ONDANSETRON 2 MG/ML
4 INJECTION INTRAMUSCULAR; INTRAVENOUS EVERY 6 HOURS PRN
Status: DISCONTINUED | OUTPATIENT
Start: 2024-09-04 | End: 2024-09-04

## 2024-09-04 RX ORDER — PETROLATUM,WHITE
OINTMENT IN PACKET (GRAM) TOPICAL PRN
Status: DISCONTINUED | OUTPATIENT
Start: 2024-09-04 | End: 2024-09-04 | Stop reason: CLARIF

## 2024-09-04 RX ORDER — METHYLERGONOVINE MALEATE 0.2 MG/ML
200 INJECTION INTRAVENOUS PRN
Status: DISCONTINUED | OUTPATIENT
Start: 2024-09-04 | End: 2024-09-06 | Stop reason: HOSPADM

## 2024-09-04 RX ORDER — ONDANSETRON 4 MG/1
4 TABLET, ORALLY DISINTEGRATING ORAL EVERY 6 HOURS PRN
Status: DISCONTINUED | OUTPATIENT
Start: 2024-09-04 | End: 2024-09-06 | Stop reason: HOSPADM

## 2024-09-04 RX ORDER — ACETAMINOPHEN 325 MG/1
650 TABLET ORAL EVERY 4 HOURS PRN
Status: DISCONTINUED | OUTPATIENT
Start: 2024-09-04 | End: 2024-09-04

## 2024-09-04 RX ORDER — SODIUM CHLORIDE, SODIUM LACTATE, POTASSIUM CHLORIDE, AND CALCIUM CHLORIDE .6; .31; .03; .02 G/100ML; G/100ML; G/100ML; G/100ML
500 INJECTION, SOLUTION INTRAVENOUS PRN
Status: DISCONTINUED | OUTPATIENT
Start: 2024-09-04 | End: 2024-09-04

## 2024-09-04 RX ORDER — EPHEDRINE SULFATE/0.9% NACL/PF 25 MG/5 ML
5 SYRINGE (ML) INTRAVENOUS PRN
Status: DISCONTINUED | OUTPATIENT
Start: 2024-09-05 | End: 2024-09-04

## 2024-09-04 RX ORDER — DOCUSATE SODIUM 100 MG/1
100 CAPSULE, LIQUID FILLED ORAL 2 TIMES DAILY PRN
Status: DISCONTINUED | OUTPATIENT
Start: 2024-09-04 | End: 2024-09-06 | Stop reason: HOSPADM

## 2024-09-04 RX ORDER — MISOPROSTOL 100 UG/1
800 TABLET ORAL PRN
Status: DISCONTINUED | OUTPATIENT
Start: 2024-09-04 | End: 2024-09-06 | Stop reason: HOSPADM

## 2024-09-04 RX ORDER — CARBOPROST TROMETHAMINE 250 UG/ML
250 INJECTION, SOLUTION INTRAMUSCULAR PRN
Status: DISCONTINUED | OUTPATIENT
Start: 2024-09-04 | End: 2024-09-06 | Stop reason: HOSPADM

## 2024-09-04 RX ORDER — ONDANSETRON 4 MG/1
4 TABLET, ORALLY DISINTEGRATING ORAL EVERY 6 HOURS PRN
Status: DISCONTINUED | OUTPATIENT
Start: 2024-09-04 | End: 2024-09-04

## 2024-09-04 RX ORDER — PHYTONADIONE 1 MG/.5ML
1 INJECTION, EMULSION INTRAMUSCULAR; INTRAVENOUS; SUBCUTANEOUS ONCE
Status: DISCONTINUED | OUTPATIENT
Start: 2024-09-04 | End: 2024-09-04 | Stop reason: CLARIF

## 2024-09-04 RX ORDER — ERYTHROMYCIN 5 MG/G
1 OINTMENT OPHTHALMIC ONCE
Status: DISCONTINUED | OUTPATIENT
Start: 2024-09-04 | End: 2024-09-04 | Stop reason: CLARIF

## 2024-09-04 RX ORDER — LABETALOL 100 MG/1
100 TABLET, FILM COATED ORAL EVERY 8 HOURS SCHEDULED
Status: DISCONTINUED | OUTPATIENT
Start: 2024-09-04 | End: 2024-09-06 | Stop reason: HOSPADM

## 2024-09-04 RX ORDER — NALOXONE HYDROCHLORIDE 0.4 MG/ML
INJECTION, SOLUTION INTRAMUSCULAR; INTRAVENOUS; SUBCUTANEOUS PRN
Status: DISCONTINUED | OUTPATIENT
Start: 2024-09-04 | End: 2024-09-04

## 2024-09-04 RX ORDER — FENTANYL CITRATE 50 UG/ML
INJECTION, SOLUTION INTRAMUSCULAR; INTRAVENOUS PRN
Status: DISCONTINUED | OUTPATIENT
Start: 2024-09-04 | End: 2024-09-04 | Stop reason: SDUPTHER

## 2024-09-04 RX ORDER — IBUPROFEN 800 MG/1
800 TABLET, FILM COATED ORAL EVERY 8 HOURS PRN
Status: DISCONTINUED | OUTPATIENT
Start: 2024-09-04 | End: 2024-09-06 | Stop reason: HOSPADM

## 2024-09-04 RX ORDER — ROPIVACAINE HYDROCHLORIDE 2 MG/ML
INJECTION, SOLUTION EPIDURAL; INFILTRATION; PERINEURAL PRN
Status: DISCONTINUED | OUTPATIENT
Start: 2024-09-04 | End: 2024-09-04 | Stop reason: SDUPTHER

## 2024-09-04 RX ORDER — ACETAMINOPHEN 500 MG
1000 TABLET ORAL ONCE
Status: COMPLETED | OUTPATIENT
Start: 2024-09-04 | End: 2024-09-04

## 2024-09-04 RX ORDER — SODIUM CHLORIDE, SODIUM LACTATE, POTASSIUM CHLORIDE, AND CALCIUM CHLORIDE .6; .31; .03; .02 G/100ML; G/100ML; G/100ML; G/100ML
500 INJECTION, SOLUTION INTRAVENOUS ONCE
Status: COMPLETED | OUTPATIENT
Start: 2024-09-04 | End: 2024-09-04

## 2024-09-04 RX ORDER — MODIFIED LANOLIN
OINTMENT (GRAM) TOPICAL PRN
Status: DISCONTINUED | OUTPATIENT
Start: 2024-09-04 | End: 2024-09-06 | Stop reason: HOSPADM

## 2024-09-04 RX ORDER — ROPIVACAINE HYDROCHLORIDE 2 MG/ML
12 INJECTION, SOLUTION EPIDURAL; INFILTRATION; PERINEURAL CONTINUOUS
Status: DISCONTINUED | OUTPATIENT
Start: 2024-09-04 | End: 2024-09-04

## 2024-09-04 RX ORDER — ONDANSETRON 2 MG/ML
4 INJECTION INTRAMUSCULAR; INTRAVENOUS EVERY 6 HOURS PRN
Status: DISCONTINUED | OUTPATIENT
Start: 2024-09-04 | End: 2024-09-06 | Stop reason: HOSPADM

## 2024-09-04 RX ORDER — LIDOCAINE HYDROCHLORIDE 10 MG/ML
5 INJECTION, SOLUTION EPIDURAL; INFILTRATION; INTRACAUDAL; PERINEURAL ONCE
Status: DISCONTINUED | OUTPATIENT
Start: 2024-09-04 | End: 2024-09-04 | Stop reason: CLARIF

## 2024-09-04 RX ORDER — ACETAMINOPHEN 500 MG
1000 TABLET ORAL EVERY 6 HOURS PRN
Status: DISCONTINUED | OUTPATIENT
Start: 2024-09-04 | End: 2024-09-06 | Stop reason: HOSPADM

## 2024-09-04 RX ORDER — EPHEDRINE SULFATE/0.9% NACL/PF 25 MG/5 ML
10 SYRINGE (ML) INTRAVENOUS
Status: DISCONTINUED | OUTPATIENT
Start: 2024-09-04 | End: 2024-09-04

## 2024-09-04 RX ORDER — LIDOCAINE HYDROCHLORIDE 20 MG/ML
INJECTION, SOLUTION EPIDURAL; INFILTRATION; INTRACAUDAL; PERINEURAL PRN
Status: DISCONTINUED | OUTPATIENT
Start: 2024-09-04 | End: 2024-09-04 | Stop reason: SDUPTHER

## 2024-09-04 RX ORDER — NALBUPHINE HYDROCHLORIDE 10 MG/ML
10 INJECTION, SOLUTION INTRAMUSCULAR; INTRAVENOUS; SUBCUTANEOUS
Status: DISCONTINUED | OUTPATIENT
Start: 2024-09-04 | End: 2024-09-04

## 2024-09-04 RX ORDER — SODIUM CHLORIDE, SODIUM LACTATE, POTASSIUM CHLORIDE, AND CALCIUM CHLORIDE .6; .31; .03; .02 G/100ML; G/100ML; G/100ML; G/100ML
1000 INJECTION, SOLUTION INTRAVENOUS PRN
Status: DISCONTINUED | OUTPATIENT
Start: 2024-09-04 | End: 2024-09-04

## 2024-09-04 RX ORDER — LABETALOL 100 MG/1
100 TABLET, FILM COATED ORAL ONCE
Status: COMPLETED | OUTPATIENT
Start: 2024-09-04 | End: 2024-09-04

## 2024-09-04 RX ORDER — PETROLATUM,WHITE/LANOLIN
OINTMENT (GRAM) TOPICAL PRN
Status: DISCONTINUED | OUTPATIENT
Start: 2024-09-04 | End: 2024-09-04 | Stop reason: CLARIF

## 2024-09-04 RX ORDER — SEVOFLURANE 250 ML/250ML
1 LIQUID RESPIRATORY (INHALATION) CONTINUOUS PRN
Status: DISCONTINUED | OUTPATIENT
Start: 2024-09-04 | End: 2024-09-04

## 2024-09-04 RX ORDER — SODIUM CHLORIDE 0.9 % (FLUSH) 0.9 %
5-40 SYRINGE (ML) INJECTION EVERY 12 HOURS SCHEDULED
Status: DISCONTINUED | OUTPATIENT
Start: 2024-09-04 | End: 2024-09-04

## 2024-09-04 RX ORDER — SODIUM CHLORIDE 0.9 % (FLUSH) 0.9 %
5-40 SYRINGE (ML) INJECTION EVERY 12 HOURS SCHEDULED
Status: DISCONTINUED | OUTPATIENT
Start: 2024-09-04 | End: 2024-09-06 | Stop reason: HOSPADM

## 2024-09-04 RX ADMIN — FENTANYL CITRATE 100 MCG: 50 INJECTION, SOLUTION INTRAMUSCULAR; INTRAVENOUS at 18:52

## 2024-09-04 RX ADMIN — ROPIVACAINE HYDROCHLORIDE 10 ML/HR: 2 INJECTION, SOLUTION EPIDURAL; INFILTRATION at 18:07

## 2024-09-04 RX ADMIN — BENZOCAINE AND LEVOMENTHOL: 200; 5 SPRAY TOPICAL at 21:30

## 2024-09-04 RX ADMIN — ROPIVACAINE HYDROCHLORIDE 8 ML: 2 INJECTION, SOLUTION EPIDURAL; INFILTRATION at 12:11

## 2024-09-04 RX ADMIN — Medication 999 ML/HR: at 19:48

## 2024-09-04 RX ADMIN — ROPIVACAINE HYDROCHLORIDE 10 ML/HR: 2 INJECTION, SOLUTION EPIDURAL; INFILTRATION at 12:12

## 2024-09-04 RX ADMIN — LABETALOL HYDROCHLORIDE 100 MG: 100 TABLET, FILM COATED ORAL at 20:57

## 2024-09-04 RX ADMIN — ONDANSETRON 4 MG: 2 INJECTION, SOLUTION INTRAMUSCULAR; INTRAVENOUS at 19:08

## 2024-09-04 RX ADMIN — IBUPROFEN 800 MG: 800 TABLET, FILM COATED ORAL at 21:38

## 2024-09-04 RX ADMIN — Medication 1 MILLI-UNITS/MIN: at 13:36

## 2024-09-04 RX ADMIN — METHYLERGONOVINE MALEATE 200 MCG: 0.2 INJECTION INTRAVENOUS at 20:00

## 2024-09-04 RX ADMIN — LIDOCAINE HYDROCHLORIDE 3 ML: 20 INJECTION, SOLUTION EPIDURAL; INFILTRATION; INTRACAUDAL; PERINEURAL at 18:52

## 2024-09-04 RX ADMIN — Medication 166 MILLI-UNITS/MIN: at 20:00

## 2024-09-04 RX ADMIN — SODIUM CHLORIDE, POTASSIUM CHLORIDE, SODIUM LACTATE AND CALCIUM CHLORIDE: 600; 310; 30; 20 INJECTION, SOLUTION INTRAVENOUS at 19:25

## 2024-09-04 RX ADMIN — ACETAMINOPHEN 1000 MG: 500 TABLET ORAL at 05:46

## 2024-09-04 RX ADMIN — SODIUM CHLORIDE, POTASSIUM CHLORIDE, SODIUM LACTATE AND CALCIUM CHLORIDE 500 ML: 600; 310; 30; 20 INJECTION, SOLUTION INTRAVENOUS at 07:42

## 2024-09-04 RX ADMIN — WITCH HAZEL: 500 SOLUTION RECTAL; TOPICAL at 21:30

## 2024-09-04 RX ADMIN — LIDOCAINE HYDROCHLORIDE 3 ML: 20 INJECTION, SOLUTION EPIDURAL; INFILTRATION; INTRACAUDAL; PERINEURAL at 18:57

## 2024-09-04 RX ADMIN — SODIUM CHLORIDE, POTASSIUM CHLORIDE, SODIUM LACTATE AND CALCIUM CHLORIDE: 600; 310; 30; 20 INJECTION, SOLUTION INTRAVENOUS at 11:00

## 2024-09-04 RX ADMIN — LIDOCAINE HYDROCHLORIDE 3 ML: 20 INJECTION, SOLUTION EPIDURAL; INFILTRATION; INTRACAUDAL; PERINEURAL at 19:02

## 2024-09-04 ASSESSMENT — PAIN DESCRIPTION - LOCATION
LOCATION: ABDOMEN

## 2024-09-04 ASSESSMENT — PAIN SCALES - GENERAL
PAINLEVEL_OUTOF10: 10
PAINLEVEL_OUTOF10: 9

## 2024-09-04 ASSESSMENT — PAIN DESCRIPTION - DESCRIPTORS
DESCRIPTORS: CRAMPING
DESCRIPTORS: CRAMPING
DESCRIPTORS: DISCOMFORT

## 2024-09-04 ASSESSMENT — PAIN DESCRIPTION - ORIENTATION: ORIENTATION: LOWER

## 2024-09-04 NOTE — PROGRESS NOTES
Pt denies any leaking of fluid or bleeding at this time. Pt reports that she has noticed decreased fetal movement last night and this morning.

## 2024-09-04 NOTE — PROGRESS NOTES
ELVIE. AJ Bragg called and notified of most recent SVE, contraction pattern and pts current pain level. Provider requests to offer pt to stay and receive IV fluids. Provider will be passing off to rounding provider this morning to make further orders. Provider would like RN to start IV and draw admission labs in case pt is admitted for labor. 500LR fluid bolus ordered followed by 125ml/hr rate.

## 2024-09-04 NOTE — ED PROVIDER NOTES
eMERGENCY dEPARTMENT eNCOUnter        CHIEF COMPLAINT    Chief Complaint   Patient presents with    Contractions every 10-15 minutes     Pt is due to have first child on Friday and this is her second pregnancy. States that she has had contractions since 6 PM last night. Has not felt water break.       HPI    Bibiana Dangelo is a 18 y.o. female who presents to ED via private vehicle.  Patient is  2 para 0 at 39 gestational weeks.  Patient is scheduled to be induced on Friday in 3 days.  Patient started having contractions about every 10-15  minutes around 6 pm.  No amniotic fluid leak.  Patient's obstetrician is from [facility]    REVIEW OF SYSTEMS    All systems reviewed and positives are in the HPI      PAST MEDICAL HISTORY    Past Medical History:   Diagnosis Date    Acute depression 2020    ADHD (attention deficit hyperactivity disorder)     Headache      delivery     Strep throat     Trauma        SURGICAL HISTORY    History reviewed. No pertinent surgical history.    CURRENT MEDICATIONS    Current Outpatient Rx   Medication Sig Dispense Refill    Prenatal Vit-Fe Fumarate-FA (PRENATAL VITAMINS) 28-0.8 MG TABS Take 1 tablet by mouth daily 90 tablet 3    WESTAB MAX 2.5-25-2 MG TABS Take 1 tablet by mouth daily (Patient not taking: Reported on 2024)         ALLERGIES    No Known Allergies    FAMILY HISTORY    Family History   Problem Relation Age of Onset    Mental Illness Maternal Grandmother     Arthritis Maternal Grandmother     Diabetes Maternal Grandmother     Prostate Cancer Maternal Grandfather     Breast Cancer Neg Hx     Ovarian Cancer Neg Hx     Stroke Neg Hx        SOCIAL HISTORY    Social History     Socioeconomic History    Marital status: Single     Spouse name: None    Number of children: None    Years of education: None    Highest education level: None   Tobacco Use    Smoking status: Never    Smokeless tobacco: Never   Vaping Use    Vaping status: Never Used

## 2024-09-04 NOTE — ED TRIAGE NOTES
Meds and history reviewed with pt and mother as source. Pt reports that she is a  2, para 0. States she has not felt her water break, but feels lots of pressure. Due for induction on Friday at [place] as Richie Emily is her OBGYN. Contraction at this time and lasted about a minute. Ariel RN obtains fetal heart tones, 159.     This RN and fellow nurses Ariel and Beth at bedside with Dr. Nava during vaginal exam.

## 2024-09-04 NOTE — PROGRESS NOTES
Pt arrives to unit accompanied by her mother. Pt does not speak english, pts mother is requesting to translate for pt. Pt c/o contractions since 1800 last night. Pt is currently rating her contractions 10/10. Pt is oriented to room and requested to change into a gown and provide a urine sample. No further questions at this time.

## 2024-09-04 NOTE — ED NOTES
Ariel KILPATRICK calls [facility] Supervisor and gives report on pt. Registration calls [facility] and reports this as well.

## 2024-09-04 NOTE — H&P
Department of Obstetrics and Gynecology   Obstetrics History and Physical  Elishakhadijah Mcfarland [facility]  H&P Admission Inpatient  Note        CHIEF COMPLAINT:  term , who came in latent labor last pm, continues to rate contractions 10/10.  Using translating ipad to communicate. Mother in room as well who speaks English.    HISTORY OF PRESENT ILLNESS:      The patient is a 18 y.o. female at 39w3d.  OB History          2    Para   1    Term   0       0    AB   0    Living   0         SAB   0    IAB   0    Ectopic   0    Molar   0    Multiple   0    Live Births   0            Patient presents with a chief complaint as above and is being admitted for active phase labor    Estimated Due Date: Estimated Date of Delivery: 24    PRENATAL CARE:    Complicated by: ADHD, unmedicated; hx of 19 week unexplained stillbirth, [facility] referral without sequelae    PAST OB HISTORY:  OB History          2    Para   1    Term   0       0    AB   0    Living   0         SAB   0    IAB   0    Ectopic   0    Molar   0    Multiple   0    Live Births   0                Past Medical History:        Diagnosis Date    Acute depression 2020    ADHD (attention deficit hyperactivity disorder)     Headache      delivery     Strep throat     Trauma      Past Surgical History:    History reviewed. No pertinent surgical history.  Allergies:  Patient has no known allergies.    Social History:    Social History     Socioeconomic History    Marital status: Single     Spouse name: Not on file    Number of children: Not on file    Years of education: Not on file    Highest education level: Not on file   Occupational History    Not on file   Tobacco Use    Smoking status: Never    Smokeless tobacco: Never   Vaping Use    Vaping status: Never Used   Substance and Sexual Activity    Alcohol use: Never    Drug use: Never    Sexual activity: Yes     Partners: Male     Comment: none   Other Topics Concern    Not on file    Social History Narrative    Not on file     Social Determinants of Health     Financial Resource Strain: Low Risk  (6/7/2024)    Overall Financial Resource Strain (CARDIA)     Difficulty of Paying Living Expenses: Not hard at all   Food Insecurity: No Food Insecurity (6/7/2024)    Hunger Vital Sign     Worried About Running Out of Food in the Last Year: Never true     Ran Out of Food in the Last Year: Never true   Transportation Needs: Unknown (6/7/2024)    PRAPARE - Transportation     Lack of Transportation (Medical): Not on file     Lack of Transportation (Non-Medical): No   Physical Activity: Not on file   Stress: Not on file   Social Connections: Not on file   Intimate Partner Violence: Not on file   Housing Stability: Unknown (6/7/2024)    Housing Stability Vital Sign     Unable to Pay for Housing in the Last Year: Not on file     Number of Places Lived in the Last Year: Not on file     Unstable Housing in the Last Year: No     Family History:       Problem Relation Age of Onset    Mental Illness Maternal Grandmother     Arthritis Maternal Grandmother     Diabetes Maternal Grandmother     Prostate Cancer Maternal Grandfather     Breast Cancer Neg Hx     Ovarian Cancer Neg Hx     Stroke Neg Hx      Medications Prior to Admission:  Medications Prior to Admission: Prenatal Vit-Fe Fumarate-FA (PRENATAL VITAMINS) 28-0.8 MG TABS, Take 1 tablet by mouth daily  WESTAB MAX 2.5-25-2 MG TABS, Take 1 tablet by mouth daily (Patient not taking: Reported on 5/30/2024)    REVIEW OF SYSTEMS:    CONSTITUTIONAL:  negative  RESPIRATORY:  negative  CARDIOVASCULAR:  negative  GASTROINTESTINAL:  negative  ALLERGIC/IMMUNOLOGIC:  negative  NEUROLOGICAL:  negative  BEHAVIOR/PSYCH:  negative    PHYSICAL EXAM:  Vitals:    09/04/24 0415 09/04/24 0430 09/04/24 0737   BP: (!) 126/91 120/84 124/81   Pulse: 86 83 80   Resp: 16 18    Temp: 97.6 °F (36.4 °C)  97.9 °F (36.6 °C)   TempSrc: Oral  Oral   SpO2: 98%       General appearance:  awake,

## 2024-09-04 NOTE — PROGRESS NOTES
Pt instructed on how to push button whenever she feels baby movement to complete NST. Pt does not report feeling any movement since her arrival. Pt is given juice and is requested to drink cold fluids. Fetal strip has moderate variability with accelerations at this time.

## 2024-09-04 NOTE — FLOWSHEET NOTE
Talked with Dr Brown informed of patient's presence,  EDC, weeks of gestation,  negative GBS, Norwegian speaking,   2 para 0, one  20 week demise.  Breast and bottle feeding,  orders may be placed post delivery   Detail Level: Zone

## 2024-09-04 NOTE — ANESTHESIA PROCEDURE NOTES
Epidural Block    Patient location during procedure: patient floor  Start time: 9/4/2024 11:57 AM  End time: 9/4/2024 12:11 PM  Reason for block: labor epidural  Staffing  Resident/CRNA: Devin Vincent APRN - CRNA  Performed by: Devin Vincent APRN - CRNA  Authorized by: Devin Vincent APRN - CRNA    Epidural  Patient position: sitting  Prep: ChloraPrep  Patient monitoring: cardiac monitor, continuous pulse ox and frequent blood pressure checks  Approach: midline  Location: L3-4  Injection technique: NIKKIE air  Provider prep: mask and sterile gloves  Needle  Needle type: Tuohy   Needle gauge: 17 G  Needle length: 3.5 in  Needle insertion depth: 4.5 cm  Catheter type: side hole  Catheter size: 19 G  Catheter at skin depth: 13 cm  Test dose: negativeCatheter Secured: tegaderm and tape  Assessment  Sensory level: T10  Hemodynamics: stable  Attempts: 2  Outcomes: uncomplicated and patient tolerated procedure well  Preanesthetic Checklist  Completed: patient identified, IV checked, site marked, risks and benefits discussed, surgical/procedural consents, equipment checked, pre-op evaluation, timeout performed, anesthesia consent given, oxygen available and monitors applied/VS acknowledged

## 2024-09-04 NOTE — FLOWSHEET NOTE
Talked with staff at Elisha Mcfarland CNM office, informed to have Elisha Mcfarland CNM call when available

## 2024-09-04 NOTE — FLOWSHEET NOTE
Elisha Mcfarland CNM  talked with online  #[phone]  Elisha Soni informed her of plan of care, pain control, activity, diet,  as well as SVE results, all questions answered, patient verbalized understanding

## 2024-09-04 NOTE — FLOWSHEET NOTE
Cherie Kyle CRNA notified patient is still having pain after pressing Epidural button times 3.  Cherie states on her way

## 2024-09-04 NOTE — PROGRESS NOTES
Bibiana Dangelo is here in L&D at 39w3d for:rule out uterine contractions      Maternal Vitals  Temp: 97.6 °F (36.4 °C)  Temp Source: Oral  BP: 120/84  Pulse: 83  Respirations: 18         Findings  Baseline: 130 BPM  Baseline Classification: Normal  Variability: Moderate  Decelerations: None  Accelerations: Yes  Acoustic Stimulator: No  Fetal Movement: Yes  Multiple Gestation?: No  Uterine contractions/10 min.: 0  Interpretation: Reactive  Interpreted by: LEANN Farooq RN/LEANN Chin RN             NST Time start:  0520  NST Time stop:   0540      NST is reactive. Quality of tracing is satisfactory.

## 2024-09-04 NOTE — FLOWSHEET NOTE
Talked with Elisha Mcfarland CNM informed of last SVE, of 4 1/2 cm dilated,  irregular contractions,  patient states pain is less but would like Epidural, also informed of strip variability going from minimal to moderate with accelerations, orders received

## 2024-09-04 NOTE — FLOWSHEET NOTE
Talked with Elisha Mcfarland CNM informed of last SVE, contraction pattern of every 9 minutes,  patient comfortable with Epidural,  orders received

## 2024-09-04 NOTE — FLOWSHEET NOTE
Elisha Mcfarland CNM informed of last SVE of 8 cm 0 to + 1 station,  patient in pain using PCA Epidural for pain control

## 2024-09-04 NOTE — PROGRESS NOTES
NENA Bragg CNM called and notified of pt arrival, pregnancy history, vitals, complaint, SVE and that pt has a reactive NST. Provider was informed that pt is currently rating contractions 10/10 in pain. Provider orders for 1G Tylenol to be administered and would like writer to repeat SVE in one hour. If no change in SVE provider is okay for pt to be discharged.

## 2024-09-04 NOTE — FLOWSHEET NOTE
Devin Vincent CRNA into room,  Willi # [id number],   Devin talked with patient concerning Epidural and consent done,  Willi remained online to talk patient through Epidural.  12:01  Consent done per , patient, this nurse and Devin Vincent  12:04:  Epidural initiated   12:10  Test dose done

## 2024-09-04 NOTE — ED NOTES
Pt and mother instructed to go to [facility] and told that they are getting a room ready for her on the OB unit. Also instructed to go to ER first upon arrival. Mother verbalizes understanding.

## 2024-09-04 NOTE — ANESTHESIA PRE PROCEDURE
Diagnosis Code    Acute depression F32.A    Migraine with aura and without status migrainosus, not intractable G43.109    Fetal demise before 20 weeks with retention of dead fetus O02.1     (normal spontaneous vaginal delivery) O80    Decreased fetal movement affecting management of mother, antepartum O36.8190    Uterine contractions O47.9    Term pregnancy Z34.90       Past Medical History:        Diagnosis Date    Acute depression 2020    ADHD (attention deficit hyperactivity disorder)     Headache      delivery     Strep throat     Trauma        Past Surgical History:  History reviewed. No pertinent surgical history.    Social History:    Social History     Tobacco Use    Smoking status: Never    Smokeless tobacco: Never   Substance Use Topics    Alcohol use: Never                                Counseling given: Not Answered      Vital Signs (Current):   Vitals:    24 0800 24 0900 24 0913 24 1058   BP:   119/71 131/70   Pulse:   (!) 107 84   Resp: 18 18  18   Temp:  36.7 °C (98 °F)  36.8 °C (98.2 °F)   TempSrc:  Oral  Oral   SpO2:                                                  BP Readings from Last 3 Encounters:   24 131/70   24 (!) 142/96   24 122/80       NPO Status:                                                                                 BMI:   Wt Readings from Last 3 Encounters:   24 104.3 kg (230 lb) (99%, Z= 2.32)*   24 104.3 kg (230 lb) (99%, Z= 2.32)*   24 106.1 kg (234 lb) (>99%, Z= 2.36)*     * Growth percentiles are based on CDC (Girls, 2-20 Years) data.     There is no height or weight on file to calculate BMI.    CBC:   Lab Results   Component Value Date/Time    WBC 10.2 2024 07:30 AM    RBC 4.72 2024 07:30 AM    HGB 13.0 2024 07:30 AM    HCT 39.7 2024 07:30 AM    MCV 84.1 2024 07:30 AM    RDW 15.6 2024 07:30 AM     2024 07:30 AM       CMP:   Lab Results

## 2024-09-05 LAB
ALBUMIN SERPL-MCNC: 2.9 G/DL (ref 3.5–5.2)
ALBUMIN/GLOB SERPL: 1.1 {RATIO} (ref 1–2.5)
ALP SERPL-CCNC: 395 U/L (ref 35–104)
ALT SERPL-CCNC: 83 U/L (ref 10–35)
ANION GAP SERPL CALCULATED.3IONS-SCNC: 10 MMOL/L (ref 9–16)
AST SERPL-CCNC: 69 U/L (ref 10–35)
BASOPHILS # BLD: 0.06 K/UL (ref 0–0.2)
BASOPHILS NFR BLD: 0 % (ref 0–2)
BILIRUB SERPL-MCNC: <0.2 MG/DL (ref 0–1.2)
BUN SERPL-MCNC: 10 MG/DL (ref 6–20)
BUN/CREAT SERPL: 9 (ref 9–20)
CALCIUM SERPL-MCNC: 8.8 MG/DL (ref 8.6–10.4)
CHLORIDE SERPL-SCNC: 107 MMOL/L (ref 98–107)
CO2 SERPL-SCNC: 21 MMOL/L (ref 20–31)
CREAT SERPL-MCNC: 1.1 MG/DL (ref 0.5–0.9)
CREAT UR-MCNC: 32.6 MG/DL (ref 28–217)
EOSINOPHIL # BLD: 0.06 K/UL (ref 0–0.44)
EOSINOPHILS RELATIVE PERCENT: 0 % (ref 1–4)
ERYTHROCYTE [DISTWIDTH] IN BLOOD BY AUTOMATED COUNT: 15.7 % (ref 11.8–14.4)
GFR, ESTIMATED: 71 ML/MIN/1.73M2
GLUCOSE SERPL-MCNC: 75 MG/DL (ref 74–99)
HCT VFR BLD AUTO: 37.6 % (ref 36.3–47.1)
HGB BLD-MCNC: 12.4 G/DL (ref 11.9–15.1)
IMM GRANULOCYTES # BLD AUTO: 0.1 K/UL (ref 0–0.3)
IMM GRANULOCYTES NFR BLD: 1 %
LYMPHOCYTES NFR BLD: 3.19 K/UL (ref 1.2–5.2)
LYMPHOCYTES RELATIVE PERCENT: 19 % (ref 25–45)
MCH RBC QN AUTO: 27.6 PG (ref 25.2–33.5)
MCHC RBC AUTO-ENTMCNC: 33 G/DL (ref 28.4–34.8)
MCV RBC AUTO: 83.6 FL (ref 82.6–102.9)
MONOCYTES NFR BLD: 1.07 K/UL (ref 0.1–1.4)
MONOCYTES NFR BLD: 7 % (ref 2–8)
NEUTROPHILS NFR BLD: 73 % (ref 34–64)
NEUTS SEG NFR BLD: 12.01 K/UL (ref 1.8–8)
NRBC BLD-RTO: 0 PER 100 WBC
PLATELET # BLD AUTO: 160 K/UL (ref 138–453)
PMV BLD AUTO: 12.3 FL (ref 8.1–13.5)
POTASSIUM SERPL-SCNC: 4.1 MMOL/L (ref 3.7–5.3)
PROT SERPL-MCNC: 5.6 G/DL (ref 6.6–8.7)
RBC # BLD AUTO: 4.5 M/UL (ref 3.95–5.11)
SODIUM SERPL-SCNC: 138 MMOL/L (ref 136–145)
TOTAL PROTEIN, URINE: 8 MG/DL
URINE TOTAL PROTEIN CREATININE RATIO: 0.25 (ref 0–0.2)
WBC OTHER # BLD: 16.5 K/UL (ref 4.5–13.5)

## 2024-09-05 PROCEDURE — 80053 COMPREHEN METABOLIC PANEL: CPT

## 2024-09-05 PROCEDURE — 6370000000 HC RX 637 (ALT 250 FOR IP): Performed by: ADVANCED PRACTICE MIDWIFE

## 2024-09-05 PROCEDURE — 99024 POSTOP FOLLOW-UP VISIT: CPT | Performed by: ADVANCED PRACTICE MIDWIFE

## 2024-09-05 PROCEDURE — 7200000001 HC VAGINAL DELIVERY

## 2024-09-05 PROCEDURE — 85025 COMPLETE CBC W/AUTO DIFF WBC: CPT

## 2024-09-05 PROCEDURE — 1220000000 HC SEMI PRIVATE OB R&B

## 2024-09-05 RX ADMIN — IBUPROFEN 800 MG: 800 TABLET, FILM COATED ORAL at 15:48

## 2024-09-05 RX ADMIN — LABETALOL HYDROCHLORIDE 100 MG: 100 TABLET, FILM COATED ORAL at 04:51

## 2024-09-05 RX ADMIN — IBUPROFEN 800 MG: 800 TABLET, FILM COATED ORAL at 07:42

## 2024-09-05 RX ADMIN — ACETAMINOPHEN 1000 MG: 500 TABLET ORAL at 20:21

## 2024-09-05 RX ADMIN — LABETALOL HYDROCHLORIDE 100 MG: 100 TABLET, FILM COATED ORAL at 22:56

## 2024-09-05 RX ADMIN — ACETAMINOPHEN 1000 MG: 500 TABLET ORAL at 13:33

## 2024-09-05 RX ADMIN — LABETALOL HYDROCHLORIDE 100 MG: 100 TABLET, FILM COATED ORAL at 14:50

## 2024-09-05 ASSESSMENT — PAIN DESCRIPTION - ORIENTATION
ORIENTATION: LOWER
ORIENTATION: LOWER

## 2024-09-05 ASSESSMENT — PAIN DESCRIPTION - DESCRIPTORS
DESCRIPTORS: CRAMPING
DESCRIPTORS: SORE
DESCRIPTORS: CRAMPING

## 2024-09-05 ASSESSMENT — PAIN DESCRIPTION - LOCATION
LOCATION: ABDOMEN

## 2024-09-05 ASSESSMENT — PAIN SCALES - GENERAL
PAINLEVEL_OUTOF10: 6
PAINLEVEL_OUTOF10: 4
PAINLEVEL_OUTOF10: 7

## 2024-09-05 ASSESSMENT — PAIN - FUNCTIONAL ASSESSMENT
PAIN_FUNCTIONAL_ASSESSMENT: ACTIVITIES ARE NOT PREVENTED
PAIN_FUNCTIONAL_ASSESSMENT: ACTIVITIES ARE NOT PREVENTED

## 2024-09-05 NOTE — LACTATION NOTE
Pt is sleeping. Mother of pt in room. States that pt prefers information in Congolese and asks LC to return later.    Gave information in Congolese:  Caring for yourself and your  - [facility] postpartum book  How to Breastfeed - [facility] publication  Information from [employer].

## 2024-09-05 NOTE — ANESTHESIA POSTPROCEDURE EVALUATION
Department of Anesthesiology  Postprocedure Note    Patient: Bibiana Dangelo  MRN: [id number]  YOB: 2005  Date of evaluation: 9/5/2024    Procedure Summary       Date: 09/04/24 Room / Location:     Anesthesia Start: 1157 Anesthesia Stop: 1945    Procedure: Labor Analgesia Diagnosis:     Scheduled Providers:  Responsible Provider: Devin Vincent APRN - CRNA    Anesthesia Type: epidural ASA Status: 2            Anesthesia Type: No value filed.    Lexi Phase I: Lexi Score: 9    Lexi Phase II:      Anesthesia Post Evaluation    Patient location during evaluation: floor  Patient participation: complete - patient participated  Level of consciousness: awake  Airway patency: patent  Nausea & Vomiting: no nausea and no vomiting  Cardiovascular status: blood pressure returned to baseline  Respiratory status: acceptable  Hydration status: euvolemic  Comments: Pt denies headache, sensory and motor function returned to baseline  Pain management: adequate    No notable events documented.

## 2024-09-05 NOTE — PROGRESS NOTES
used to complete assessment on  and mother. Any issues and concerns addressed. Offered to help pt order breakfast, pt declined. Discussed circumcision and if parents are requesting one be completed, pt agreeable. Also discussed  bath, pt requesting one.      used was Heath [id number]

## 2024-09-05 NOTE — CONSULTS
Session ID: [id number]  Language: Latvian   ID: #[id number]   Name: Leandra     services used to explain breast pump and hand pump. Pt reports that LC did not visit her today. Discussed options of pumping, after assisting to get  to breast. Arnold ate for approximately 10mins, see feeding log. Also discussed assessments. Any issues and concerns addressed, pt denies needs at this time.

## 2024-09-05 NOTE — L&D DELIVERY NOTE
[place], Baby Pending Bibiana [[id number]]      Labor Events     Labor: No   Steroids: None  Cervical Ripening Date/Time:      Antibiotics Received during Labor: No  Rupture Date/Time:      Rupture Type: SROM  Fluid Color: Other (Comment)  Fluid Odor: None  Fluid Volume: Other (Comment)  Induction: None  Augmentation: Oxytocin  Labor Complications: None       Anesthesia    Method: Epidural       Labor Event Times      Labor onset date/time:        Dilation complete date/time:  24 19:32:00 EDT     Start pushing date/time:  2024 19:38:00   Decision date/time (emergent ):            Delivery Details      Delivery Date: 24 Delivery Time: 19:45:00   Delivery Type: Vaginal, Spontaneous               Presentation    Presentation: Vertex  _: Occiput  _: Anterior       Shoulder Dystocia    Shoulder Dystocia Present?: No       Assisted Delivery Details    Forceps Attempted?: No  Vacuum Extractor Attempted?: No                           Cord    Vessels: 3 Vessels  Complications: None  Delayed Cord Clamping?: Yes  Cord Clamped Date/Time: 2024 19:50:00  Cord Blood Disposition: Lab  Gases Sent?: No              Placenta    Date/Time: 2024 19:52:57  Removal: Spontaneous  Appearance: Intact  Disposition: Discarded       Lacerations    Episiotomy: None  Perineal Lacerations: 1st  Other Lacerations: no non-perineal laceration  Number of Repair Packets: 1       Vaginal Counts    Initial Count Personnel: ROBERT HANLEY LPN  Initial Count Verified By: FERNANDO RAJAN RN  Intial Sponge Count: Correct Intial Needles Count: Correct Intial Instruments Count: Correct          Blood Loss  Mother: Bibiana Ramirez #[id number]     Start of Mother's Information      Delivery Blood Loss  24 0745 - 24      None                 End of Mother's Information  Mother: Bibiana Ramirez #[id number]                Delivery Providers    Delivering clinician: Elisha Mcfarland, RAKESH -

## 2024-09-05 NOTE — PROGRESS NOTES
Department of Obstetrics and Gynecology  Labor and Delivery       Post Partum Progress Note             SUBJECTIVE:  pt states she is having some cramping pain and vaginal pain but is it tolerable - pt is breastfeeding but she is worried about her little milk.    OBJECTIVE:      Vitals:  /66   Pulse 69   Temp 97.9 °F (36.6 °C) (Oral)   Resp 16   LMP 12/03/2023 (Exact Date)   SpO2 98%   Breastfeeding Unknown   Patient Vitals for the past 24 hrs:   BP Temp Temp src Pulse Resp SpO2   09/05/24 0737 121/66 97.9 °F (36.6 °C) Oral 69 16 98 %   09/05/24 0451 121/73 -- -- -- -- --   09/05/24 0448 -- -- -- 76 -- --   09/05/24 0356 (!) 112/56 97.8 °F (36.6 °C) Oral 75 16 --   09/05/24 0009 122/73 98.1 °F (36.7 °C) Oral 95 16 --   09/04/24 2154 136/77 -- -- 100 -- --   09/04/24 2139 (!) 145/80 -- -- 90 -- --   09/04/24 2127 132/79 -- -- 95 -- --   09/04/24 2112 (!) 128/92 -- -- 87 -- --   09/04/24 2055 (!) 142/67 -- -- 81 -- --   09/04/24 2054 (!) 142/67 -- -- 81 -- --   09/04/24 2042 (!) 143/94 -- -- 98 -- --   09/04/24 2039 (!) 137/99 -- -- 90 -- --   09/04/24 2025 (!) 140/90 -- -- 95 -- --   09/04/24 2024 (!) 143/94 -- -- 86 -- --   09/04/24 2009 125/71 -- -- 94 -- --   09/04/24 1954 131/76 -- -- 93 -- --   09/04/24 1941 -- -- -- -- -- 100 %   09/04/24 1936 -- -- -- -- -- 100 %   09/04/24 1935 -- -- -- -- -- 100 %   09/04/24 1931 -- -- -- -- -- 99 %   09/04/24 1926 -- -- -- -- -- 99 %   09/04/24 1924 135/85 -- -- (!) 111 -- --   09/04/24 1921 -- -- -- -- -- 100 %   09/04/24 1916 -- -- -- -- -- 100 %   09/04/24 1911 -- -- -- -- -- 100 %   09/04/24 1909 136/84 -- -- (!) 122 -- --   09/04/24 1906 -- -- -- -- -- 100 %   09/04/24 1904 134/89 -- -- (!) 105 -- --   09/04/24 1901 -- -- -- -- -- 100 %   09/04/24 1859 131/85 -- -- 86 -- --   09/04/24 1856 -- -- -- -- -- 100 %   09/04/24 1855 (!) 131/94 -- -- 93 -- --   09/04/24 1851 -- -- -- -- -- 100 %   09/04/24 1846 -- -- -- -- -- 100 %   09/04/24 1841 -- -- -- -- --  100 %   09/04/24 1531 -- -- -- -- -- 100 %   09/04/24 1526 -- -- -- -- -- 100 %   09/04/24 1525 135/77 -- -- 65 -- --   09/04/24 1521 -- -- -- -- -- 100 %   09/04/24 1516 -- -- -- -- -- 98 %   09/04/24 1511 -- -- -- -- -- 100 %   09/04/24 1509 119/73 -- -- 83 -- --   09/04/24 1506 -- -- -- -- -- 100 %   09/04/24 1505 -- -- -- -- -- 100 %   09/04/24 1500 -- 98 °F (36.7 °C) Oral -- 18 100 %   09/04/24 1456 -- -- -- -- -- 100 %   09/04/24 1454 123/73 -- -- 82 -- --   09/04/24 1451 -- -- -- -- -- 100 %   09/04/24 1448 -- -- -- -- -- 92 %   09/04/24 1445 -- -- -- -- -- 100 %   09/04/24 1441 -- -- -- -- -- 100 %   09/04/24 1439 116/76 -- -- 85 -- --   09/04/24 1436 -- -- -- -- -- 100 %   09/04/24 1431 -- -- -- -- -- 100 %   09/04/24 1426 -- -- -- -- -- 99 %   09/04/24 1424 123/75 -- -- 75 -- --   09/04/24 1421 -- -- -- -- -- 99 %   09/04/24 1416 -- -- -- -- -- 100 %   09/04/24 1415 -- -- -- -- -- 100 %   09/04/24 1411 -- -- -- -- -- 96 %   09/04/24 1409 131/80 -- -- 77 -- --   09/04/24 1406 -- -- -- -- -- 98 %   09/04/24 1401 -- -- -- -- -- 96 %   09/04/24 1400 -- -- -- -- 18 --   09/04/24 1356 -- -- -- -- -- 100 %   09/04/24 1354 120/71 -- -- 82 -- --   09/04/24 1351 -- -- -- -- -- 93 %   09/04/24 1350 -- -- -- -- -- 94 %   09/04/24 1346 -- -- -- -- -- 100 %   09/04/24 1341 -- -- -- -- -- 100 %   09/04/24 1339 112/70 -- -- 81 -- --   09/04/24 1336 -- -- -- -- -- 100 %   09/04/24 1335 -- -- -- -- -- 100 %   09/04/24 1331 -- -- -- -- -- 100 %   09/04/24 1326 -- -- -- -- -- 100 %   09/04/24 1324 113/70 -- -- 71 -- --   09/04/24 1321 -- -- -- -- -- 99 %   09/04/24 1316 -- -- -- -- -- 99 %   09/04/24 1311 -- -- -- -- -- 99 %   09/04/24 1309 109/72 -- -- 73 -- --   09/04/24 1306 -- -- -- -- -- 98 %   09/04/24 1301 -- -- -- -- -- 98 %   09/04/24 1300 -- 98.2 °F (36.8 °C) Axillary -- 18 98 %   09/04/24 1256 -- -- -- -- -- 98 %   09/04/24 1254 125/77 -- -- 82 -- --   09/04/24 1251 -- -- -- -- -- 98 %   09/04/24 1250 -- -- -- --

## 2024-09-06 VITALS
OXYGEN SATURATION: 97 % | DIASTOLIC BLOOD PRESSURE: 83 MMHG | HEART RATE: 76 BPM | SYSTOLIC BLOOD PRESSURE: 126 MMHG | TEMPERATURE: 98 F | RESPIRATION RATE: 16 BRPM

## 2024-09-06 LAB
ALBUMIN SERPL-MCNC: 2.9 G/DL (ref 3.5–5.2)
ALBUMIN/GLOB SERPL: 1.2 {RATIO} (ref 1–2.5)
ALP SERPL-CCNC: 311 U/L (ref 35–104)
ALT SERPL-CCNC: 53 U/L (ref 10–35)
ANION GAP SERPL CALCULATED.3IONS-SCNC: 10 MMOL/L (ref 9–16)
AST SERPL-CCNC: 34 U/L (ref 10–35)
BASOPHILS # BLD: 0.05 K/UL (ref 0–0.2)
BASOPHILS NFR BLD: 0 % (ref 0–2)
BILIRUB SERPL-MCNC: <0.2 MG/DL (ref 0–1.2)
BUN SERPL-MCNC: 12 MG/DL (ref 6–20)
BUN/CREAT SERPL: 12 (ref 9–20)
CALCIUM SERPL-MCNC: 8.4 MG/DL (ref 8.6–10.4)
CHLORIDE SERPL-SCNC: 106 MMOL/L (ref 98–107)
CO2 SERPL-SCNC: 21 MMOL/L (ref 20–31)
CREAT SERPL-MCNC: 1 MG/DL (ref 0.5–0.9)
EOSINOPHIL # BLD: 0.22 K/UL (ref 0–0.44)
EOSINOPHILS RELATIVE PERCENT: 2 % (ref 1–4)
ERYTHROCYTE [DISTWIDTH] IN BLOOD BY AUTOMATED COUNT: 15.9 % (ref 11.8–14.4)
GFR, ESTIMATED: 80 ML/MIN/1.73M2
GLUCOSE SERPL-MCNC: 82 MG/DL (ref 74–99)
HCT VFR BLD AUTO: 34.9 % (ref 36.3–47.1)
HGB BLD-MCNC: 11.4 G/DL (ref 11.9–15.1)
IMM GRANULOCYTES # BLD AUTO: 0.08 K/UL (ref 0–0.3)
IMM GRANULOCYTES NFR BLD: 1 %
LYMPHOCYTES NFR BLD: 3.14 K/UL (ref 1.2–5.2)
LYMPHOCYTES RELATIVE PERCENT: 27 % (ref 25–45)
MCH RBC QN AUTO: 27.5 PG (ref 25.2–33.5)
MCHC RBC AUTO-ENTMCNC: 32.7 G/DL (ref 28.4–34.8)
MCV RBC AUTO: 84.3 FL (ref 82.6–102.9)
MONOCYTES NFR BLD: 0.61 K/UL (ref 0.1–1.4)
MONOCYTES NFR BLD: 5 % (ref 2–8)
NEUTROPHILS NFR BLD: 65 % (ref 34–64)
NEUTS SEG NFR BLD: 7.35 K/UL (ref 1.8–8)
NRBC BLD-RTO: 0 PER 100 WBC
PLATELET # BLD AUTO: 178 K/UL (ref 138–453)
PMV BLD AUTO: 11.8 FL (ref 8.1–13.5)
POTASSIUM SERPL-SCNC: 4.2 MMOL/L (ref 3.7–5.3)
PROT SERPL-MCNC: 5.3 G/DL (ref 6.6–8.7)
RBC # BLD AUTO: 4.14 M/UL (ref 3.95–5.11)
SODIUM SERPL-SCNC: 137 MMOL/L (ref 136–145)
WBC OTHER # BLD: 11.5 K/UL (ref 4.5–13.5)

## 2024-09-06 PROCEDURE — 6370000000 HC RX 637 (ALT 250 FOR IP): Performed by: ADVANCED PRACTICE MIDWIFE

## 2024-09-06 PROCEDURE — 80053 COMPREHEN METABOLIC PANEL: CPT

## 2024-09-06 PROCEDURE — 85025 COMPLETE CBC W/AUTO DIFF WBC: CPT

## 2024-09-06 PROCEDURE — 99024 POSTOP FOLLOW-UP VISIT: CPT

## 2024-09-06 RX ORDER — IBUPROFEN 800 MG/1
800 TABLET, FILM COATED ORAL EVERY 8 HOURS PRN
Qty: 90 TABLET | Refills: 0 | Status: SHIPPED | OUTPATIENT
Start: 2024-09-06

## 2024-09-06 RX ORDER — PSEUDOEPHEDRINE HCL 30 MG
100 TABLET ORAL 2 TIMES DAILY PRN
Qty: 60 CAPSULE | Refills: 0 | Status: SHIPPED | OUTPATIENT
Start: 2024-09-06

## 2024-09-06 RX ORDER — LABETALOL 100 MG/1
100 TABLET, FILM COATED ORAL EVERY 8 HOURS SCHEDULED
Qty: 90 TABLET | Refills: 0 | Status: SHIPPED | OUTPATIENT
Start: 2024-09-06

## 2024-09-06 RX ADMIN — LABETALOL HYDROCHLORIDE 100 MG: 100 TABLET, FILM COATED ORAL at 09:16

## 2024-09-06 RX ADMIN — ACETAMINOPHEN 1000 MG: 500 TABLET ORAL at 09:15

## 2024-09-06 ASSESSMENT — PAIN DESCRIPTION - DESCRIPTORS: DESCRIPTORS: CRAMPING;SORE

## 2024-09-06 ASSESSMENT — PAIN DESCRIPTION - LOCATION: LOCATION: ABDOMEN

## 2024-09-06 ASSESSMENT — PAIN SCALES - GENERAL: PAINLEVEL_OUTOF10: 8

## 2024-09-06 NOTE — PROGRESS NOTES
POST PARTUM DAY # 2    Patient Name: Bibiana Dangelo  Patient : 2005  Room/Bed: 0205/0205-01  Admission Date/Time: 2024  3:52 AM  MRN #: [id number]  [facility] #: [id number]    OBGYN Provider: Richie Diaz CNM    Date: 2024  Time: 11:32 AM        Bibiana Dangelo is a 18 y.o. female  This patient was seen today. She Delivered on 24. Her pregnancy was complicated by:     Patient Active Problem List   Diagnosis    Acute depression    Migraine with aura and without status migrainosus, not intractable    Fetal demise before 20 weeks with retention of dead fetus     (normal spontaneous vaginal delivery)    Decreased fetal movement affecting management of mother, antepartum    Uterine contractions    Term pregnancy    Normal delivery    Normal  (single liveborn)       Today she is doing well without any chief complaint. Her lochia is moderate. She denies chest pain, shortness of breath, headache, lightheadedness, blurred vision, peripheral edema, and palpitations. She is not breast feeding and she denies any signs or symptoms of mastitis. These were reviewed with her in detail. She is ambulating well. Her bowels are not regular. Her voiding pattern is Normal. I reviewed signs and symptoms of post partum depression with the patient, she currently denies any of these symptoms.      Vitals:    24 1939 24 0428 24 0445 24 0915   BP: 134/77 (!) 114/57 (!) 115/56 126/83   Pulse: 60 (!) 104 70 76   Resp: 16 16     Temp: 97.5 °F (36.4 °C) 97.4 °F (36.3 °C)     TempSrc: Oral Oral     SpO2:           Constitutional:  Awake, alert, cooperative, no apparent distress.                          Appears to be bonding well with baby, does not appear overly stressed with infant handling    Pain:  adequate control with oral meds    Breasts:  Soft without tenderness, nipples are intact    Abdominal Exam: Soft, non-tender, lax muscle tone                                Fundus is

## 2024-09-06 NOTE — DISCHARGE INSTRUCTIONS
Follow-up with your OB doctor as specified.    [facility] OB Department phone: [phone]    Dr. Adolfo MORRISON  Dr. Giselle MORRISON  PRINCE Bueno  [address]  [facility] [address]  [facility] [address]   [place] [phone]   or [place] [phone]       DIET  Eat a well balanced diet focusing on foods high in fiber and protein.   Drink plenty of fluids especially water.  To avoid constipation you may take a mild stool softener as recommended by your doctor or midwife.    ACTIVITY  Gradually increase your activity.  Resume exercise regimen only after advice by your doctor or midwife.  Avoid lifting anything heavier than a gallon of milk for SIX weeks.   Avoid driving until your doctor or midwife has given their approval.  Rise slowly from a lying to sitting and then a standing position.  Climb stairs one at a time.  Use caution when carrying your baby up and down the stairs.  NO SEXUAL Activity for 6 weeks or until advised by your doctor; Nothing in vagina: intercourse, tampons, or douching. No swimming or tub baths x 6 weeks.  Be prepared to discuss family planning at your follow-up OB visit.   You may feel tired or have a lack of energy.  You may continue your prenatal vitamin to replenish nutrients post delivery.  Nap when baby naps to catch up on sleep.     EMOTIONS  You may feel mehta, sad, teary, & overwhelmed.  Contact your OB provider if you feel you may be showing signs of postpartum depression, or have thoughts of harming yourself or your infant.  If infant will not stop crying, contact another adult for help or place infant in their crib on their back and take a break.  NEVER shake your infant.      BLEEDING  Vaginal bleeding will decrease in amount over the next few weeks.  You will notice that as your activity increases, your flow may increase.  This is your body's way of telling you, you need to take things easier and rest more often.  Call your care provider if you are  than the size of a lemon.  If you are experiencing extreme weakness or dizziness.   If you are having flu-like symptoms such as achy muscles or joints.  There is a foul smell or a green color to your vaginal bleeding.  If you have pain that cannot be relieved.  You have persistent burning or frequency with urination.  Call if you have concerns about your well-being.  You are unable to sleep, eat, or are having thoughts of harming yourself or your baby.   You have swelling, bleeding, drainage, foul odor, redness, or warmth in/around your incision or stitches.  You have a red, warm, tender area in your calf.

## 2024-09-06 NOTE — CONSULTS
Session ID: [id number]  Language: Tamazight   ID: #[id number]   Name: Deepa    Discussed hearing results of second hearing screen and need for follow up with a pediatric audiologist.  Verbalized understanding.  Denies needs.  States wants to feed baby before leaving.

## 2024-09-06 NOTE — FLOWSHEET NOTE
Patient discharged home from unit accompanied by significant other with understanding of follow up care.   No signs of distress noted.

## 2024-09-06 NOTE — LACTATION NOTE
Pt refuses use of  for this visit.  Reviewed lactation information left in room yesterday- steps to a good latch, how to know infant is eating enough, feeding/satiety cues.  Pt denies questions about breastfeeding, states that it does not hurt when infant latches and she does wonder if this is normal. Explained that pain is not normal, and that if latch is comfortable, that is good.  Offered assistance, pt states that she will ask if she needs help.  Gave information on how to contact LC for follow up assistance. Verbalizes understanding.

## 2024-09-06 NOTE — DISCHARGE SUMMARY
Obstetrical Discharge Form        Gestational Age:39w3d    Antepartum complications: none    Date of Delivery: 24    Type of Delivery:        Delivered By:  JULIAN CAMERON CNM    Assisted By:N/A}      Baby: male    Anesthesia:  epidural      Intrapartum complications: None    Feeding method: bottle     Blood type: O POSITIVE      Rubella:    Rubella Antibody, IgG   Date Value Ref Range Status   2024 90.53 IU/mL Final     Comment:                 REFERENCE RANGE:  <5.0       NON-REACTIVE (non-immune)  5.0 TO 9.9 EQUIVOCAL  >=10.0     REACTIVE     (immune)             T. Pallidium, IGG:    T. pallidum, IgG   Date Value Ref Range Status   2024 NONREACTIVE NONREACTIVE Final     Comment:           T. pallidum antibodies are not detected.  There is no serological evidence of infection with T. pallidum (early primary syphilis   cannot be excluded).  Retest in 2-4 weeks if syphilis is clinically suspect.             Hepatitis B Surface Antigen:   Hepatitis B Surface Ag   Date Value Ref Range Status   2024 NONREACTIVE NONREACTIVE Final     HIV:  No results found for: \"ENM02EN\"    Results for orders placed or performed during the hospital encounter of 24   Urinalysis   Result Value Ref Range    Color, UA Yellow Yellow    Turbidity UA Clear Clear    Glucose, Ur NEGATIVE NEGATIVE mg/dL    Bilirubin, Urine NEGATIVE NEGATIVE    Ketones, Urine NEGATIVE NEGATIVE mg/dL    Specific Gravity, UA 1.010 1.010 - 1.020    Urine Hgb NEGATIVE NEGATIVE    pH, Urine 6.0 5.0 - 9.0    Protein, UA NEGATIVE NEGATIVE mg/dL    Urobilinogen, Urine Normal 0.0 - 1.0 EU/dL    Nitrite, Urine NEGATIVE NEGATIVE    Leukocyte Esterase, Urine NEGATIVE NEGATIVE   CBC auto differential   Result Value Ref Range    WBC 10.2 4.5 - 13.5 k/uL    RBC 4.72 3.95 - 5.11 m/uL    Hemoglobin 13.0 11.9 - 15.1 g/dL    Hematocrit 39.7 36.3 - 47.1 %    MCV 84.1 82.6 - 102.9 fL    MCH 27.5 25.2 - 33.5 pg    MCHC 32.7 28.4 - 34.8 g/dL    RDW 15.6  condition        Plan:   Follow up in 1 week(s) for blood pressure check

## 2024-09-06 NOTE — CONSULTS
Session ID: [id number]  Language: Upper sorbian   ID: #[id number]   Name: Cortez    Discharge instructions reviewed with patient.  Patient verbalized understanding and denies any questions.  Discharge papers given to patient.  Discussed BP cuff, how to use, what to put on BP log, and to bring BP log to next doctors appointment.  Denies questions.

## 2024-09-10 ENCOUNTER — POSTPARTUM VISIT (OUTPATIENT)
Dept: OBGYN CLINIC | Age: 19
End: 2024-09-10

## 2024-09-10 VITALS
BODY MASS INDEX: 34.86 KG/M2 | HEIGHT: 68 IN | WEIGHT: 230 LBS | SYSTOLIC BLOOD PRESSURE: 128 MMHG | DIASTOLIC BLOOD PRESSURE: 74 MMHG

## 2024-09-10 PROCEDURE — G8417 CALC BMI ABV UP PARAM F/U: HCPCS | Performed by: OBSTETRICS & GYNECOLOGY

## 2024-09-10 PROCEDURE — G8427 DOCREV CUR MEDS BY ELIG CLIN: HCPCS | Performed by: OBSTETRICS & GYNECOLOGY

## 2024-09-10 PROCEDURE — 1036F TOBACCO NON-USER: CPT | Performed by: OBSTETRICS & GYNECOLOGY

## 2024-09-10 PROCEDURE — 99024 POSTOP FOLLOW-UP VISIT: CPT | Performed by: OBSTETRICS & GYNECOLOGY

## 2024-09-10 PROCEDURE — 1111F DSCHRG MED/CURRENT MED MERGE: CPT | Performed by: OBSTETRICS & GYNECOLOGY

## 2024-09-29 ENCOUNTER — HOSPITAL ENCOUNTER (EMERGENCY)
Age: 19
Discharge: HOME | End: 2024-09-29
Attending: FAMILY MEDICINE
Payer: COMMERCIAL

## 2024-09-29 VITALS
OXYGEN SATURATION: 98 % | BODY MASS INDEX: 31.52 KG/M2 | DIASTOLIC BLOOD PRESSURE: 87 MMHG | SYSTOLIC BLOOD PRESSURE: 126 MMHG | HEIGHT: 68 IN | RESPIRATION RATE: 18 BRPM | TEMPERATURE: 97 F | WEIGHT: 208 LBS | HEART RATE: 65 BPM

## 2024-09-29 DIAGNOSIS — R30.0 DYSURIA: ICD-10-CM

## 2024-09-29 DIAGNOSIS — B96.89 BACTERIAL VAGINOSIS: Primary | ICD-10-CM

## 2024-09-29 DIAGNOSIS — N76.0 BACTERIAL VAGINOSIS: Primary | ICD-10-CM

## 2024-09-29 DIAGNOSIS — Z32.02 URINE PREGNANCY TEST NEGATIVE: ICD-10-CM

## 2024-09-29 DIAGNOSIS — R82.90 ABNORMAL FINDING ON URINALYSIS: ICD-10-CM

## 2024-09-29 LAB
-: ABNORMAL
BACTERIA URNS QL MICRO: ABNORMAL
BILIRUB UR QL STRIP: NEGATIVE
CLARITY UR: CLEAR
COLOR UR: YELLOW
COMMENT: ABNORMAL
EPI CELLS #/AREA URNS HPF: ABNORMAL /HPF
GLUCOSE UR STRIP-MCNC: NEGATIVE MG/DL
HCG UR QL: NEGATIVE
HGB UR QL STRIP.AUTO: ABNORMAL
KETONES UR STRIP-MCNC: NEGATIVE MG/DL
LEUKOCYTE ESTERASE UR QL STRIP: ABNORMAL
MICROORGANISM/AGENT SPEC: ABNORMAL
NITRITE UR QL STRIP: NEGATIVE
PH UR STRIP: 5 [PH] (ref 5–8)
PROT UR STRIP-MCNC: ABNORMAL MG/DL
RBC #/AREA URNS HPF: ABNORMAL /HPF (ref 0–2)
SP GR UR STRIP: 1.02 (ref 1–1.03)
SPECIMEN DESCRIPTION: ABNORMAL
UROBILINOGEN UR STRIP-ACNC: NORMAL EU/DL (ref 0–1)
WBC #/AREA URNS HPF: ABNORMAL /HPF

## 2024-09-29 PROCEDURE — 99283 EMERGENCY DEPT VISIT LOW MDM: CPT

## 2024-09-29 PROCEDURE — 81001 URINALYSIS AUTO W/SCOPE: CPT

## 2024-09-29 PROCEDURE — 81025 URINE PREGNANCY TEST: CPT

## 2024-09-29 PROCEDURE — 87086 URINE CULTURE/COLONY COUNT: CPT

## 2024-09-29 PROCEDURE — 87210 SMEAR WET MOUNT SALINE/INK: CPT

## 2024-09-29 RX ORDER — CEPHALEXIN 500 MG/1
500 CAPSULE ORAL 3 TIMES DAILY
Qty: 30 CAPSULE | Refills: 0 | Status: SHIPPED | OUTPATIENT
Start: 2024-09-29 | End: 2024-10-09

## 2024-09-29 RX ORDER — METRONIDAZOLE 7.5 MG/G
GEL VAGINAL
Qty: 70 G | Refills: 0 | Status: SHIPPED | OUTPATIENT
Start: 2024-09-29 | End: 2024-10-06

## 2024-09-29 ASSESSMENT — PAIN DESCRIPTION - LOCATION: LOCATION: PERINEUM

## 2024-09-29 ASSESSMENT — PAIN SCALES - GENERAL: PAINLEVEL_OUTOF10: 8

## 2024-09-29 ASSESSMENT — PAIN - FUNCTIONAL ASSESSMENT: PAIN_FUNCTIONAL_ASSESSMENT: 0-10

## 2024-09-29 ASSESSMENT — PAIN DESCRIPTION - PAIN TYPE: TYPE: ACUTE PAIN

## 2024-09-29 ASSESSMENT — PAIN DESCRIPTION - DESCRIPTORS: DESCRIPTORS: BURNING

## 2024-09-29 NOTE — ED PROVIDER NOTES
[facility]  EMERGENCY DEPARTMENT ENCOUNTER      Pt Name: Bibiana Dangelo  MRN: [id number]  Birthdate 2005  Date of evaluation: 2024  Provider: Nilton Cochran MD    CHIEF COMPLAINT       Chief Complaint   Patient presents with    Urinary Burning     PT has had increase burning and drainage since Wednesday.          HISTORY OF PRESENT ILLNESS      Bibiana Dangelo is a 18 y.o. female who presents to the emergency department via private vehicle with significant other and young baby, patient stated she has been having a lot of discomfort urination, states she forward so she delivered a baby to have a vaginal laceration, states over the last several days she has had increased itching, burning, general discomfort in vaginal area, describing occasional whitish to green sticky discharge.  Patient has not discussed with her OB.  Does not think she could have an STD.    PCP: None  OB: AJ Mcfarland or Emily        REVIEW OF SYSTEMS       Review of Systems   Genitourinary:  Positive for dysuria and vaginal discharge.   All other systems reviewed and are negative.        PAST MEDICAL HISTORY     Past Medical History:   Diagnosis Date    Acute depression 2020    ADHD (attention deficit hyperactivity disorder)     Headache      delivery     Strep throat          SURGICAL HISTORY       History reviewed. No pertinent surgical history.      CURRENT MEDICATIONS       Previous Medications    DOCUSATE SODIUM (COLACE, DULCOLAX) 100 MG CAPS    Take 100 mg by mouth 2 times daily as needed for Constipation    IBUPROFEN (ADVIL;MOTRIN) 800 MG TABLET    Take 1 tablet by mouth every 8 hours as needed for Pain    LABETALOL (NORMODYNE) 100 MG TABLET    Take 1 tablet by mouth every 8 hours    PRENATAL VIT-FE FUMARATE-FA (PRENATAL VITAMINS) 28-0.8 MG TABS    Take 1 tablet by mouth daily       ALLERGIES       Patient has no known allergies.    FAMILY HISTORY       Family History   Problem Relation Age

## 2024-09-30 LAB
MICROORGANISM SPEC CULT: NORMAL
SPECIMEN DESCRIPTION: NORMAL

## 2024-10-25 ENCOUNTER — POSTPARTUM VISIT (OUTPATIENT)
Dept: OBGYN CLINIC | Age: 19
End: 2024-10-25

## 2024-10-25 ENCOUNTER — HOSPITAL ENCOUNTER (OUTPATIENT)
Age: 19
Setting detail: SPECIMEN
Discharge: HOME | End: 2024-10-25
Payer: COMMERCIAL

## 2024-10-25 VITALS
HEIGHT: 68 IN | WEIGHT: 206 LBS | DIASTOLIC BLOOD PRESSURE: 76 MMHG | BODY MASS INDEX: 31.22 KG/M2 | SYSTOLIC BLOOD PRESSURE: 118 MMHG

## 2024-10-25 DIAGNOSIS — N89.8 VAGINAL DISCHARGE: ICD-10-CM

## 2024-10-25 PROCEDURE — 87070 CULTURE OTHR SPECIMN AEROBIC: CPT

## 2024-10-25 NOTE — PROGRESS NOTES
[facility]  [address] [place] [address]  Dept: [phone]    Patient Name: Bibiana Dangelo  Patient Age: 18 y.o.  Date of Visit: 10/25/2024    Chief Complaint   Patient presents with    Postpartum Care     Pt presents today for ppv. Pt thinks she had an infection a few weeks ago but unsure if she still has it d/t being on her cycle. Pt only experiencing burning as a symptom.        HPI:  DELIVERY DATE: 24  TYPE OF DELIVERY: normal spontaneous vaginal delivery  PROVIDER: Elisha Mcfarland CNM  PERINEUM: 1st degree    Bibiana was seen for her 6 week visit. She does report some vaginal burning and discharge that started last week.   Her Male infant is healthy.  She is breast and bottle feeding.   She is not experiencing pain.    She reports her lochia has stopped  She reports no perineal discomfort.  She denies urinary incontinence.  Her bowels have returned to her normal pattern.  She is back to her normal activity pattern.  She has had her first menses.    Bibiana has not engaged in intercourse.   She does not report a mood disorder.    She feels she is not having difficulty coping.  Bibiana feels her family adjustment is effective.           Postpartum Depression: Low Risk  (2024)    Platter  Depression Scale     Last EPDS Total Score: 2     Last EPDS Self Harm Result: Never         OBJECTIVE:   Wt Readings from Last 3 Encounters:   10/25/24 93.4 kg (206 lb) (98%, Z= 2.03)*   24 94.3 kg (208 lb) (98%, Z= 2.06)*   09/10/24 104.3 kg (230 lb) (99%, Z= 2.32)*     * Growth percentiles are based on CDC (Girls, 2-20 Years) data.       Blood pressure 118/76, height 1.727 m (5' 8\"), weight 93.4 kg (206 lb), last menstrual period 2024, currently breastfeeding.    Breast exam: deferred    Abdomen: Soft non-tender without guarding.      Perineum: healing with good reapproximation, no evidence of infection or

## 2024-10-27 LAB
MICROORGANISM SPEC CULT: NORMAL
MICROORGANISM SPEC CULT: NORMAL
SERVICE CMNT-IMP: NORMAL
SPECIMEN DESCRIPTION: NORMAL

## 2024-10-28 LAB
MICROORGANISM SPEC CULT: NORMAL
SERVICE CMNT-IMP: NORMAL
SPECIMEN DESCRIPTION: NORMAL

## 2025-05-21 DIAGNOSIS — Z13.29 SCREENING FOR THYROID DISORDER: Primary | ICD-10-CM

## 2025-05-23 ENCOUNTER — HOSPITAL ENCOUNTER (OUTPATIENT)
Age: 20
Discharge: HOME | End: 2025-05-23
Payer: COMMERCIAL

## 2025-05-23 DIAGNOSIS — Z13.29 SCREENING FOR THYROID DISORDER: ICD-10-CM

## 2025-05-23 LAB — TSH SERPL DL<=0.05 MIU/L-ACNC: 3.33 UIU/ML (ref 0.27–4.2)

## 2025-05-23 PROCEDURE — 84443 ASSAY THYROID STIM HORMONE: CPT

## 2025-05-23 PROCEDURE — 36415 COLL VENOUS BLD VENIPUNCTURE: CPT

## 2025-05-28 ENCOUNTER — RESULTS FOLLOW-UP (OUTPATIENT)
Dept: OBGYN | Age: 20
End: 2025-05-28